# Patient Record
Sex: FEMALE | Race: WHITE | NOT HISPANIC OR LATINO | Employment: OTHER | URBAN - METROPOLITAN AREA
[De-identification: names, ages, dates, MRNs, and addresses within clinical notes are randomized per-mention and may not be internally consistent; named-entity substitution may affect disease eponyms.]

---

## 2017-04-27 PROBLEM — D68.51 FACTOR 5 LEIDEN MUTATION, HETEROZYGOUS: Status: ACTIVE | Noted: 2017-04-27

## 2019-08-20 PROBLEM — R73.09 HEMOGLOBIN A1C LESS THAN 7.0%: Status: ACTIVE | Noted: 2019-08-20

## 2019-08-20 PROBLEM — Z86.718 HISTORY OF DVT (DEEP VEIN THROMBOSIS): Status: ACTIVE | Noted: 2019-08-20

## 2019-08-20 PROBLEM — Z86.711 HISTORY OF PULMONARY EMBOLUS (PE): Status: ACTIVE | Noted: 2019-08-20

## 2019-08-27 ENCOUNTER — HOSPITAL ENCOUNTER (OUTPATIENT)
Dept: RADIOLOGY | Facility: HOSPITAL | Age: 74
Discharge: HOME | End: 2019-08-27
Attending: NURSE PRACTITIONER
Payer: MEDICARE

## 2019-08-27 DIAGNOSIS — Z13.820 OSTEOPOROSIS SCREENING: ICD-10-CM

## 2019-08-27 DIAGNOSIS — Z78.0 POST-MENOPAUSAL: ICD-10-CM

## 2019-08-27 DIAGNOSIS — Z87.39 HISTORY OF OSTEOPOROSIS: ICD-10-CM

## 2019-08-27 PROCEDURE — 77080 DXA BONE DENSITY AXIAL: CPT | Mod: 26,,, | Performed by: RADIOLOGY

## 2019-08-27 PROCEDURE — 77080 DEXA BONE DENSITY SPINE HIP: ICD-10-PCS | Mod: 26,,, | Performed by: RADIOLOGY

## 2019-08-27 PROCEDURE — 77080 DXA BONE DENSITY AXIAL: CPT | Mod: TC

## 2019-09-04 ENCOUNTER — HOSPITAL ENCOUNTER (OUTPATIENT)
Dept: RADIOLOGY | Facility: HOSPITAL | Age: 74
Discharge: HOME | End: 2019-09-04
Attending: NURSE PRACTITIONER
Payer: MEDICARE

## 2019-09-04 DIAGNOSIS — Z87.19 HISTORY OF DIVERTICULITIS: ICD-10-CM

## 2019-09-04 DIAGNOSIS — R10.32 LLQ PAIN: ICD-10-CM

## 2019-09-04 DIAGNOSIS — R53.83 FATIGUE, UNSPECIFIED TYPE: ICD-10-CM

## 2019-09-04 DIAGNOSIS — R14.2 BELCHING: ICD-10-CM

## 2019-09-04 PROCEDURE — 74177 CT ABDOMEN PELVIS WITH CONTRAST: ICD-10-PCS | Mod: 26,,, | Performed by: RADIOLOGY

## 2019-09-04 PROCEDURE — 74177 CT ABD & PELVIS W/CONTRAST: CPT | Mod: TC

## 2019-09-04 PROCEDURE — 25500020 PHARM REV CODE 255: Performed by: NURSE PRACTITIONER

## 2019-09-04 PROCEDURE — 74177 CT ABD & PELVIS W/CONTRAST: CPT | Mod: 26,,, | Performed by: RADIOLOGY

## 2019-09-04 RX ADMIN — IOHEXOL 15 ML: 300 INJECTION, SOLUTION INTRAVENOUS at 10:09

## 2019-09-04 RX ADMIN — IOHEXOL 15 ML: 300 INJECTION, SOLUTION INTRAVENOUS at 11:09

## 2019-09-04 RX ADMIN — IOHEXOL 75 ML: 350 INJECTION, SOLUTION INTRAVENOUS at 12:09

## 2020-01-30 ENCOUNTER — LAB VISIT (OUTPATIENT)
Dept: LAB | Facility: HOSPITAL | Age: 75
End: 2020-01-30
Attending: NURSE PRACTITIONER
Payer: MEDICARE

## 2020-01-30 DIAGNOSIS — Z79.01 LONG TERM CURRENT USE OF ANTICOAGULANT THERAPY: ICD-10-CM

## 2020-01-30 DIAGNOSIS — K92.1 BLOOD IN STOOL: ICD-10-CM

## 2020-01-30 DIAGNOSIS — D68.51 FACTOR 5 LEIDEN MUTATION, HETEROZYGOUS: ICD-10-CM

## 2020-01-30 PROBLEM — Z86.718 HISTORY OF DVT (DEEP VEIN THROMBOSIS): Status: RESOLVED | Noted: 2019-08-20 | Resolved: 2020-01-30

## 2020-01-30 PROBLEM — Z86.711 HISTORY OF PULMONARY EMBOLUS (PE): Status: RESOLVED | Noted: 2019-08-20 | Resolved: 2020-01-30

## 2020-01-30 LAB
BASOPHILS # BLD AUTO: 0.04 K/UL (ref 0–0.2)
BASOPHILS NFR BLD: 0.6 % (ref 0–1.9)
DIFFERENTIAL METHOD: NORMAL
EOSINOPHIL # BLD AUTO: 0.1 K/UL (ref 0–0.5)
EOSINOPHIL NFR BLD: 1.8 % (ref 0–8)
ERYTHROCYTE [DISTWIDTH] IN BLOOD BY AUTOMATED COUNT: 13.6 % (ref 11.5–14.5)
HCT VFR BLD AUTO: 42 % (ref 37–48.5)
HGB BLD-MCNC: 13.7 G/DL (ref 12–16)
IMM GRANULOCYTES # BLD AUTO: 0.01 K/UL (ref 0–0.04)
IMM GRANULOCYTES NFR BLD AUTO: 0.1 % (ref 0–0.5)
LYMPHOCYTES # BLD AUTO: 2.4 K/UL (ref 1–4.8)
LYMPHOCYTES NFR BLD: 34.4 % (ref 18–48)
MCH RBC QN AUTO: 29.8 PG (ref 27–31)
MCHC RBC AUTO-ENTMCNC: 32.6 G/DL (ref 32–36)
MCV RBC AUTO: 92 FL (ref 82–98)
MONOCYTES # BLD AUTO: 0.6 K/UL (ref 0.3–1)
MONOCYTES NFR BLD: 8.8 % (ref 4–15)
NEUTROPHILS # BLD AUTO: 3.7 K/UL (ref 1.8–7.7)
NEUTROPHILS NFR BLD: 54.3 % (ref 38–73)
NRBC BLD-RTO: 0 /100 WBC
PLATELET # BLD AUTO: 204 K/UL (ref 150–350)
PMV BLD AUTO: 10.1 FL (ref 9.2–12.9)
RBC # BLD AUTO: 4.59 M/UL (ref 4–5.4)
WBC # BLD AUTO: 6.84 K/UL (ref 3.9–12.7)

## 2020-01-30 PROCEDURE — 85025 COMPLETE CBC W/AUTO DIFF WBC: CPT

## 2020-01-30 PROCEDURE — 36415 COLL VENOUS BLD VENIPUNCTURE: CPT

## 2020-02-21 ENCOUNTER — OFFICE VISIT (OUTPATIENT)
Dept: SURGERY | Facility: CLINIC | Age: 75
End: 2020-02-21
Payer: MEDICARE

## 2020-02-21 VITALS
HEIGHT: 69 IN | WEIGHT: 167 LBS | BODY MASS INDEX: 24.73 KG/M2 | OXYGEN SATURATION: 96 % | HEART RATE: 83 BPM | TEMPERATURE: 97 F | SYSTOLIC BLOOD PRESSURE: 129 MMHG | DIASTOLIC BLOOD PRESSURE: 93 MMHG | RESPIRATION RATE: 14 BRPM

## 2020-02-21 DIAGNOSIS — Z01.818 PRE-OP TESTING: ICD-10-CM

## 2020-02-21 DIAGNOSIS — K92.1 HEMATOCHEZIA: Primary | ICD-10-CM

## 2020-02-21 DIAGNOSIS — K21.9 GASTROESOPHAGEAL REFLUX DISEASE, ESOPHAGITIS PRESENCE NOT SPECIFIED: ICD-10-CM

## 2020-02-21 PROCEDURE — 99204 PR OFFICE/OUTPT VISIT, NEW, LEVL IV, 45-59 MIN: ICD-10-PCS | Mod: S$GLB,,, | Performed by: SURGERY

## 2020-02-21 PROCEDURE — 99204 OFFICE O/P NEW MOD 45 MIN: CPT | Mod: S$GLB,,, | Performed by: SURGERY

## 2020-02-21 RX ORDER — SODIUM CHLORIDE 9 MG/ML
INJECTION, SOLUTION INTRAVENOUS CONTINUOUS
Status: CANCELLED | OUTPATIENT
Start: 2020-02-21

## 2020-02-21 RX ORDER — POLYETHYLENE GLYCOL 3350, SODIUM SULFATE ANHYDROUS, SODIUM BICARBONATE, SODIUM CHLORIDE, POTASSIUM CHLORIDE 236; 22.74; 6.74; 5.86; 2.97 G/4L; G/4L; G/4L; G/4L; G/4L
POWDER, FOR SOLUTION ORAL ONCE
Status: ON HOLD | COMMUNITY
End: 2020-03-20 | Stop reason: HOSPADM

## 2020-02-21 RX ORDER — LIDOCAINE HYDROCHLORIDE 10 MG/ML
1 INJECTION, SOLUTION EPIDURAL; INFILTRATION; INTRACAUDAL; PERINEURAL ONCE
Status: DISCONTINUED | OUTPATIENT
Start: 2020-02-21 | End: 2020-05-13

## 2020-02-21 NOTE — PATIENT INSTRUCTIONS
Upper GI Endoscopy     During endoscopy, a long, flexible tube is used to view the inside of your upper GI tract.      Upper GI endoscopy allows your healthcare provider to look directly into the beginning of your gastrointestinal (GI) tract. The esophagus, stomach, and duodenum (the first part of the small intestine) make up the upper GI tract.   Before the exam  Follow these and any other instructions you are given before your endoscopy. If you dont follow the healthcare providers instructions carefully, the test may need to be canceled or done over:  · Don't eat or drink anything after midnight the night before your exam. If your exam is in the afternoon, drink only clear liquids in the morning. Don't eat or drink anything for 8 hours before the exam. In some cases, you may be able to take medicines with sips of water until 2 hours before the procedure. Speak with your healthcare provider about this.   · Bring your X-rays and any other test results you have.  · Because you will be sedated, arrange for an adult to drive you home after the exam.  · Tell your healthcare provider before the exam if you are taking any medicines or have any medical problems.  The procedure  Here is what to expect:  · You will lie on the endoscopy table. Usually patients lie on the left side.  · You will be monitored and given oxygen.  · Your throat may be numbed with a spray or gargle. You are given medicine through an intravenous (IV) line that will help you relax and remain comfortable. You may be awake or asleep during the procedure.  · The healthcare provider will put the endoscope in your mouth and down your esophagus. It is thinner than most pieces of food that you swallow. It will not affect your breathing. The medicine helps keep you from gagging.  · Air is put into your GI tract to expand it. It can make you burp.  · During the procedure, the healthcare provider can take biopsies (tissue samples), remove abnormalities,  such as polyps, or treat abnormalities through a variety of devices placed through the endoscope. You will not feel this.   · The endoscope carries images of your upper GI tract to a video screen. If you are awake, you may be able to look at the images.  · After the procedure is done, you will rest for a time. An adult must drive you home.  When to call your healthcare provider  Contact your healthcare provider if you have:  · Black or tarry stools, or blood in your stool  · Fever  · Pain in your belly that does not go away  · Nausea and vomiting, or vomiting blood   Date Last Reviewed: 7/1/2016 © 2000-2017 [employer]. [address] [place] [address], [place] [address]. All rights reserved. This information is not intended as a substitute for professional medical care. Always follow your healthcare professional's instructions.        Colonoscopy     A camera attached to a flexible tube with a viewing lens is used to take video pictures.     Colonoscopy is a test to view the inside of your lower digestive tract (colon and rectum). Sometimes it can show the last part of the small intestine (ileum). During the test, small pieces of tissue may be removed for testing. This is called a biopsy. Small growths, such as polyps, may also be removed.   Why is colonoscopy done?  The test is done to help look for colon cancer. And it can help find the source of abdominal pain, bleeding, and changes in bowel habits. It may be needed once a year, depending on factors such as your:  · Age  · Health history  · Family health history  · Symptoms  · Results from any prior colonoscopy  Risks and possible complications  These include:  · Bleeding               · A puncture or tear in the colon   · Risks of anesthesia  · A cancer lesion not being seen  Getting ready   To prepare for the test:  · Talk with your healthcare provider about the risks of the test (see below). Also ask your healthcare provider about alternatives to the  test.  · Tell your healthcare provider about any medicines you take. Also tell him or her about any health conditions you may have.  · Make sure your rectum and colon are empty for the test. Follow the diet and bowel prep instructions exactly. If you dont, the test may need to be rescheduled.  · Plan for a friend or family member to drive you home after the test.     Colonoscopy provides an inside view of the entire colon.     You may discuss the results with your doctor right away or at a future visit.  During the test   The test is usually done in the hospital on an outpatient basis. This means you go home the same day. The procedure takes about 30 minutes. During that time:  · You are given relaxing (sedating) medicine through an IV line. You may be drowsy, or fully asleep.  · The healthcare provider will first give you a physical exam to check for anal and rectal problems.  · Then the anus is lubricated and the scope inserted.  · If you are awake, you may have a feeling similar to needing to have a bowel movement. You may also feel pressure as air is pumped into the colon. Its OK to pass gas during the procedure.  · Biopsy, polyp removal, or other treatments may be done during the test.  After the test   You may have gas right after the test. It can help to try to pass it to help prevent later bloating. Your healthcare provider may discuss the results with you right away. Or you may need to schedule a follow-up visit to talk about the results. After the test, you can go back to your normal eating and other activities. You may be tired from the sedation and need to rest for a few hours.  Date Last Reviewed: 11/1/2016 © 2000-2017 [employer]. [address] [place] [address]. All rights reserved. This information is not intended as a substitute for professional medical care. Always follow your healthcare professional's instructions.

## 2020-02-21 NOTE — H&P
[facility] H&P Note    Subjective:       Patient ID: Kellie Monte is a 74 y.o. female.    Chief Complaint: Consult (Referral- yusuf Miles, NP- Blood in stool)    HPI:  Kellie Monte is a 74 y.o. female history of blood clots diagnosed with factor 5 Leiden currently on Xarelto therapy presents today as a new patient referral from YUSUF Miles NP for evaluation of hematochezia as well as bloating, burping, indigestion.  Patient stated that she has had bloating, burping, indigestion for number of years.  No significant worsening symptoms.  Last EGD for this was numerous years ago.  Patient also with recent history of hematochezia.  Patient stated that it started approximately 3 weeks ago and stopped couple days afterwards.  Bright red blood, in stool and on toilet paper.  Patient with personal history of colonoscopy in 2014 which showed no evidence of colon polyps or neoplasms.  There was diverticula present. She with the bleeding got significantly concerned and presented to primary care provider who then referred the patient here today for evaluation treatment.  Patient does have a family history of colon cancer in a mother who underwent colon resection.  No weight loss or bowel habit changes otherwise.    Past Medical History:   Diagnosis Date    Blood clot in vein     Deep vein thrombosis     while pregnant in 1972, subsequent to that while on ocp, then 2 years ago. Recommended to have lifelong anticoag at [facility].    DVT (deep venous thrombosis) 1972    While pregnant.    Factor 5 Leiden mutation, heterozygous     History of DVT (deep vein thrombosis) 8/20/2019    OP (osteoporosis)     Osteoarthritis     Pulmonary embolism      Past Surgical History:   Procedure Laterality Date    APPENDECTOMY      CHOLECYSTECTOMY      COLONOSCOPY  8/07    Dr. Waters    cyst removed ENT (throat)      INNER EAR SURGERY Left     [employer]    SKIN CANCER EXCISION      tonsils      TUBAL LIGATION        Family History   Problem Relation Age of Onset    Cancer Father 75        bone    Clotting disorder Father     Cancer Mother 80        colon    Colon cancer Mother     Angina Mother     Clotting disorder Mother     Clotting disorder Brother     Heart disease Maternal Grandmother     Heart disease Paternal Grandmother      Social History     Socioeconomic History    Marital status:      Spouse name: Not on file    Number of children: Not on file    Years of education: Not on file    Highest education level: Not on file   Occupational History    Not on file   Social Needs    Financial resource strain: Not on file    Food insecurity:     Worry: Not on file     Inability: Not on file    Transportation needs:     Medical: Not on file     Non-medical: Not on file   Tobacco Use    Smoking status: Former Smoker     Packs/day: 2.00     Years: 12.00     Pack years: 24.00     Types: Cigarettes     Last attempt to quit: 1982     Years since quittin.0    Smokeless tobacco: Never Used   Substance and Sexual Activity    Alcohol use: Yes     Alcohol/week: 1.0 standard drinks     Types: 1 Glasses of wine per week     Comment: socially    Drug use: No    Sexual activity: Not Currently     Birth control/protection: None   Lifestyle    Physical activity:     Days per week: Not on file     Minutes per session: Not on file    Stress: Not on file   Relationships    Social connections:     Talks on phone: Not on file     Gets together: Not on file     Attends Voodoo service: Not on file     Active member of club or organization: Not on file     Attends meetings of clubs or organizations: Not on file     Relationship status: Not on file   Other Topics Concern    Not on file   Social History Narrative    Not on file       Current Outpatient Medications   Medication Sig Dispense Refill    atorvastatin (LIPITOR) 10 MG tablet TAKE 1 TABLET BY MOUTH EVERY NIGHT AT BEDTIME 90 tablet 0     diclofenac sodium (VOLTAREN) 1 % Gel Apply 2 g topically once daily.      multivit-mineral-iron-lutein (CENTRUM SILVER ULTRA WOMEN'S) Tab Take 1 tablet by mouth every morning.      pantoprazole (PROTONIX) 40 MG tablet Take 1 tablet (40 mg total) by mouth once daily. 30 tablet 5    polyethylene glycol (GOLYTELY,NULYTELY) 236-22.74-6.74 -5.86 gram suspension Take by mouth once.      rivaroxaban (XARELTO) 20 mg Tab Take 20 mg by mouth once daily.      tramadol (ULTRAM) 50 mg tablet Take 1 tablet (50 mg total) by mouth every 4 (four) hours as needed for Pain. 40 tablet 3     Current Facility-Administered Medications   Medication Dose Route Frequency Provider Last Rate Last Dose    lidocaine (PF) 10 mg/ml (1%) injection 10 mg  1 mL Intradermal Once Jerrod Abarca MD         Review of patient's allergies indicates:  No Known Allergies    Review of Systems   Constitutional: Negative for activity change, appetite change, chills and fever.   HENT: Negative for congestion, dental problem and ear discharge.    Eyes: Negative for discharge and itching.   Respiratory: Negative for apnea, choking and chest tightness.    Cardiovascular: Negative for chest pain and leg swelling.   Gastrointestinal: Positive for blood in stool. Negative for abdominal distention, abdominal pain, anal bleeding, constipation, diarrhea and nausea.   Endocrine: Negative for cold intolerance and heat intolerance.   Genitourinary: Negative for difficulty urinating and dyspareunia.   Musculoskeletal: Negative for arthralgias and back pain.   Skin: Negative for color change and pallor.   Neurological: Negative for dizziness and facial asymmetry.   Hematological: Negative for adenopathy. Does not bruise/bleed easily.   Psychiatric/Behavioral: Negative for agitation and behavioral problems.       Objective:      Vitals:    02/21/20 1048   BP: (!) 129/93   BP Location: Left arm   Patient Position: Sitting   BP Method: Large (Automatic)   Pulse: 83  "  Resp: 14   Temp: 96.7 °F (35.9 °C)   TempSrc: Oral   SpO2: 96%   Weight: 75.8 kg (167 lb)   Height: 5' 9" (1.753 m)     Physical Exam   Constitutional: She is oriented to person, place, and time. She appears well-developed and well-nourished. No distress.   HENT:   Head: Normocephalic and atraumatic.   Eyes: Pupils are equal, round, and reactive to light. EOM are normal.   Neck: Normal range of motion. Neck supple. No thyromegaly present.   Cardiovascular: Normal rate and regular rhythm.   Pulmonary/Chest: Effort normal and breath sounds normal.   Abdominal: Soft. Bowel sounds are normal. She exhibits no distension. There is no tenderness.   Musculoskeletal: Normal range of motion. She exhibits no edema or deformity.   Neurological: She is alert and oriented to person, place, and time. No cranial nerve deficit.   Skin: Skin is warm. Capillary refill takes less than 2 seconds. No erythema.   Psychiatric: She has a normal mood and affect. Her behavior is normal.        Assessment:       1. Hematochezia    2. Pre-op testing    3. Gastroesophageal reflux disease, esophagitis presence not specified        Plan:   Hematochezia  -     Case Request Operating Room: COLONOSCOPY, ESOPHAGOGASTRODUODENOSCOPY (EGD)    Pre-op testing  -     CBC auto differential; Future; Expected date: 05/18/2020  -     Comprehensive metabolic panel; Future; Expected date: 05/18/2020  -     EKG 12-lead; Future; Expected date: 05/18/2020    Gastroesophageal reflux disease, esophagitis presence not specified  -     Case Request Operating Room: COLONOSCOPY, ESOPHAGOGASTRODUODENOSCOPY (EGD)    Other orders  -     Progressive Mobility Protocol (mobilize patient to their highest level of functioning at least twice daily); Standing  -     Insert peripheral IV; Standing  -     lidocaine (PF) 10 mg/ml (1%) injection 10 mg  -     Full code; Standing        Medical Decision Making/Counseling:  Patient with hematochezia.  New diagnosis.  Now since stopped.  " On long-term anticoagulation therapy for previous DVTs.  Patient in need of colonoscopy for evaluation of this hematochezia.  Patient also with bloating indigestion reflux disease.  In need of EGD for further evaluation of these diagnosis sees.  Risk and benefits of EGD and colonoscopy discussed in detail in clinic today.  After risk and benefits patient voiced understanding of the risk benefits wished to proceed the near future.    Patient on long-term Xarelto therapy.  She was instructed to stop her Xarelto therapy 3 days prior to EGD and colonoscopy.    Will obtain preoperative lab test to include CBC, CMP and EKG for review by the Anesthesiologist the day of the procedure prior to induction of the anesthetic agent of choice.    Risk and benefits of EGD were discussed in clinic in depth.  Risk of EGD were discussed to include bleeding as well as perforation.  Patient understands that if the above procedural risks were to occur, he could need further intervention to include but not exclude a blood transfusion, repeat procedure, admission to the hospital, or even surgery which would likely require transfer to a higher level of care.     Risks and benefits of Colonoscopy were discussed in depth in clinic as well.  From a procedural standpoint, we discuss the benefits of colonoscopy to be finding colon cancers at early stages, including polyps which can be endoscopically resectable, to finding early stage colon cancers which can be better treated with current medical and surgical therapies in order to give patients a longer survival, if found in these early stages.  From a standpoint of risks, the risk of bleeding and perforation of the colon were discussed.  I personally discussed that if complications of bleeding or perforation were to occur, the patient could need as little as a blood transfusion and as much as possible hospital admission, repeat procedure, or even surgery.  During today's discussion of the  procedure of colonoscopy with the patient, I personally jose a the patient a picture to assist with counseling.  Total clinic time spent today 45 minutes face-to-face, with greater than half of the time spent in face to face counseling.    Patient instructed that best way to communicate with my office staff is for patient to get on the [facility] epic patient portal to expedite communication and communication issues that may occur.  Patient was given instructions on how to get on the portal.  I encouraged patient to obtain portal access as well.  Ultimately it is up to the patient to obtain access.  Patient voiced understanding.

## 2020-02-21 NOTE — H&P (VIEW-ONLY)
[facility] H&P Note    Subjective:       Patient ID: Kellie Monte is a 74 y.o. female.    Chief Complaint: Consult (Referral- yusuf Miles, NP- Blood in stool)    HPI:  Kellie Monte is a 74 y.o. female history of blood clots diagnosed with factor 5 Leiden currently on Xarelto therapy presents today as a new patient referral from YUSUF Miles NP for evaluation of hematochezia as well as bloating, burping, indigestion.  Patient stated that she has had bloating, burping, indigestion for number of years.  No significant worsening symptoms.  Last EGD for this was numerous years ago.  Patient also with recent history of hematochezia.  Patient stated that it started approximately 3 weeks ago and stopped couple days afterwards.  Bright red blood, in stool and on toilet paper.  Patient with personal history of colonoscopy in 2014 which showed no evidence of colon polyps or neoplasms.  There was diverticula present. She with the bleeding got significantly concerned and presented to primary care provider who then referred the patient here today for evaluation treatment.  Patient does have a family history of colon cancer in a mother who underwent colon resection.  No weight loss or bowel habit changes otherwise.    Past Medical History:   Diagnosis Date    Blood clot in vein     Deep vein thrombosis     while pregnant in 1972, subsequent to that while on ocp, then 2 years ago. Recommended to have lifelong anticoag at [facility].    DVT (deep venous thrombosis) 1972    While pregnant.    Factor 5 Leiden mutation, heterozygous     History of DVT (deep vein thrombosis) 8/20/2019    OP (osteoporosis)     Osteoarthritis     Pulmonary embolism      Past Surgical History:   Procedure Laterality Date    APPENDECTOMY      CHOLECYSTECTOMY      COLONOSCOPY  8/07    Dr. Waters    cyst removed ENT (throat)      INNER EAR SURGERY Left     [employer]    SKIN CANCER EXCISION      tonsils      TUBAL LIGATION        Family History   Problem Relation Age of Onset    Cancer Father 75        bone    Clotting disorder Father     Cancer Mother 80        colon    Colon cancer Mother     Angina Mother     Clotting disorder Mother     Clotting disorder Brother     Heart disease Maternal Grandmother     Heart disease Paternal Grandmother      Social History     Socioeconomic History    Marital status:      Spouse name: Not on file    Number of children: Not on file    Years of education: Not on file    Highest education level: Not on file   Occupational History    Not on file   Social Needs    Financial resource strain: Not on file    Food insecurity:     Worry: Not on file     Inability: Not on file    Transportation needs:     Medical: Not on file     Non-medical: Not on file   Tobacco Use    Smoking status: Former Smoker     Packs/day: 2.00     Years: 12.00     Pack years: 24.00     Types: Cigarettes     Last attempt to quit: 1982     Years since quittin.0    Smokeless tobacco: Never Used   Substance and Sexual Activity    Alcohol use: Yes     Alcohol/week: 1.0 standard drinks     Types: 1 Glasses of wine per week     Comment: socially    Drug use: No    Sexual activity: Not Currently     Birth control/protection: None   Lifestyle    Physical activity:     Days per week: Not on file     Minutes per session: Not on file    Stress: Not on file   Relationships    Social connections:     Talks on phone: Not on file     Gets together: Not on file     Attends Restorationism service: Not on file     Active member of club or organization: Not on file     Attends meetings of clubs or organizations: Not on file     Relationship status: Not on file   Other Topics Concern    Not on file   Social History Narrative    Not on file       Current Outpatient Medications   Medication Sig Dispense Refill    atorvastatin (LIPITOR) 10 MG tablet TAKE 1 TABLET BY MOUTH EVERY NIGHT AT BEDTIME 90 tablet 0     diclofenac sodium (VOLTAREN) 1 % Gel Apply 2 g topically once daily.      multivit-mineral-iron-lutein (CENTRUM SILVER ULTRA WOMEN'S) Tab Take 1 tablet by mouth every morning.      pantoprazole (PROTONIX) 40 MG tablet Take 1 tablet (40 mg total) by mouth once daily. 30 tablet 5    polyethylene glycol (GOLYTELY,NULYTELY) 236-22.74-6.74 -5.86 gram suspension Take by mouth once.      rivaroxaban (XARELTO) 20 mg Tab Take 20 mg by mouth once daily.      tramadol (ULTRAM) 50 mg tablet Take 1 tablet (50 mg total) by mouth every 4 (four) hours as needed for Pain. 40 tablet 3     Current Facility-Administered Medications   Medication Dose Route Frequency Provider Last Rate Last Dose    lidocaine (PF) 10 mg/ml (1%) injection 10 mg  1 mL Intradermal Once Jerrod Abarca MD         Review of patient's allergies indicates:  No Known Allergies    Review of Systems   Constitutional: Negative for activity change, appetite change, chills and fever.   HENT: Negative for congestion, dental problem and ear discharge.    Eyes: Negative for discharge and itching.   Respiratory: Negative for apnea, choking and chest tightness.    Cardiovascular: Negative for chest pain and leg swelling.   Gastrointestinal: Positive for blood in stool. Negative for abdominal distention, abdominal pain, anal bleeding, constipation, diarrhea and nausea.   Endocrine: Negative for cold intolerance and heat intolerance.   Genitourinary: Negative for difficulty urinating and dyspareunia.   Musculoskeletal: Negative for arthralgias and back pain.   Skin: Negative for color change and pallor.   Neurological: Negative for dizziness and facial asymmetry.   Hematological: Negative for adenopathy. Does not bruise/bleed easily.   Psychiatric/Behavioral: Negative for agitation and behavioral problems.       Objective:      Vitals:    02/21/20 1048   BP: (!) 129/93   BP Location: Left arm   Patient Position: Sitting   BP Method: Large (Automatic)   Pulse: 83  "  Resp: 14   Temp: 96.7 °F (35.9 °C)   TempSrc: Oral   SpO2: 96%   Weight: 75.8 kg (167 lb)   Height: 5' 9" (1.753 m)     Physical Exam   Constitutional: She is oriented to person, place, and time. She appears well-developed and well-nourished. No distress.   HENT:   Head: Normocephalic and atraumatic.   Eyes: Pupils are equal, round, and reactive to light. EOM are normal.   Neck: Normal range of motion. Neck supple. No thyromegaly present.   Cardiovascular: Normal rate and regular rhythm.   Pulmonary/Chest: Effort normal and breath sounds normal.   Abdominal: Soft. Bowel sounds are normal. She exhibits no distension. There is no tenderness.   Musculoskeletal: Normal range of motion. She exhibits no edema or deformity.   Neurological: She is alert and oriented to person, place, and time. No cranial nerve deficit.   Skin: Skin is warm. Capillary refill takes less than 2 seconds. No erythema.   Psychiatric: She has a normal mood and affect. Her behavior is normal.        Assessment:       1. Hematochezia    2. Pre-op testing    3. Gastroesophageal reflux disease, esophagitis presence not specified        Plan:   Hematochezia  -     Case Request Operating Room: COLONOSCOPY, ESOPHAGOGASTRODUODENOSCOPY (EGD)    Pre-op testing  -     CBC auto differential; Future; Expected date: 05/18/2020  -     Comprehensive metabolic panel; Future; Expected date: 05/18/2020  -     EKG 12-lead; Future; Expected date: 05/18/2020    Gastroesophageal reflux disease, esophagitis presence not specified  -     Case Request Operating Room: COLONOSCOPY, ESOPHAGOGASTRODUODENOSCOPY (EGD)    Other orders  -     Progressive Mobility Protocol (mobilize patient to their highest level of functioning at least twice daily); Standing  -     Insert peripheral IV; Standing  -     lidocaine (PF) 10 mg/ml (1%) injection 10 mg  -     Full code; Standing        Medical Decision Making/Counseling:  Patient with hematochezia.  New diagnosis.  Now since stopped.  " On long-term anticoagulation therapy for previous DVTs.  Patient in need of colonoscopy for evaluation of this hematochezia.  Patient also with bloating indigestion reflux disease.  In need of EGD for further evaluation of these diagnosis sees.  Risk and benefits of EGD and colonoscopy discussed in detail in clinic today.  After risk and benefits patient voiced understanding of the risk benefits wished to proceed the near future.    Patient on long-term Xarelto therapy.  She was instructed to stop her Xarelto therapy 3 days prior to EGD and colonoscopy.    Will obtain preoperative lab test to include CBC, CMP and EKG for review by the Anesthesiologist the day of the procedure prior to induction of the anesthetic agent of choice.    Risk and benefits of EGD were discussed in clinic in depth.  Risk of EGD were discussed to include bleeding as well as perforation.  Patient understands that if the above procedural risks were to occur, he could need further intervention to include but not exclude a blood transfusion, repeat procedure, admission to the hospital, or even surgery which would likely require transfer to a higher level of care.     Risks and benefits of Colonoscopy were discussed in depth in clinic as well.  From a procedural standpoint, we discuss the benefits of colonoscopy to be finding colon cancers at early stages, including polyps which can be endoscopically resectable, to finding early stage colon cancers which can be better treated with current medical and surgical therapies in order to give patients a longer survival, if found in these early stages.  From a standpoint of risks, the risk of bleeding and perforation of the colon were discussed.  I personally discussed that if complications of bleeding or perforation were to occur, the patient could need as little as a blood transfusion and as much as possible hospital admission, repeat procedure, or even surgery.  During today's discussion of the  procedure of colonoscopy with the patient, I personally jose a the patient a picture to assist with counseling.  Total clinic time spent today 45 minutes face-to-face, with greater than half of the time spent in face to face counseling.    Patient instructed that best way to communicate with my office staff is for patient to get on the [facility] patient portal to expedite communication and communication issues that may occur.  Patient was given instructions on how to get on the portal.  I encouraged patient to obtain portal access as well.  Ultimately it is up to the patient to obtain access.  Patient voiced understanding.

## 2020-03-09 ENCOUNTER — TELEPHONE (OUTPATIENT)
Dept: SURGERY | Facility: CLINIC | Age: 75
End: 2020-03-09

## 2020-03-09 NOTE — TELEPHONE ENCOUNTER
Writer spoke to patient and scheduled her an appt on Wednesday 03/11/20 to discuss rectal bleeding and moving her procedure date up. Patient expressed verbal understanding.

## 2020-03-09 NOTE — TELEPHONE ENCOUNTER
----- Message from Lilly Tolliver sent at 3/9/2020  9:04 AM CDT -----  Contact: patient  Type: Needs Medical Advice    Who Called:  patient  Symptoms (please be specific):  Rectal bleeding and blood in stool  How long has patient had these symptoms:  Saturday and Sunday  Best Call Back Number: [phone]  Additional Information: patient has a procedure scheduled for 03/21 but would like to have it moved up sooner because of the issues she has had this weekend.  She is concerned and the provider has instructed her that if she had additional problems she should call to move the date up.  Please call after 11:30 this morning.  She will not be available until then.

## 2020-03-11 ENCOUNTER — OFFICE VISIT (OUTPATIENT)
Dept: SURGERY | Facility: CLINIC | Age: 75
End: 2020-03-11
Payer: MEDICARE

## 2020-03-11 VITALS
OXYGEN SATURATION: 97 % | SYSTOLIC BLOOD PRESSURE: 130 MMHG | WEIGHT: 165 LBS | RESPIRATION RATE: 13 BRPM | DIASTOLIC BLOOD PRESSURE: 85 MMHG | HEART RATE: 81 BPM | HEIGHT: 69 IN | TEMPERATURE: 97 F | BODY MASS INDEX: 24.44 KG/M2

## 2020-03-11 DIAGNOSIS — K92.1 HEMATOCHEZIA: Primary | ICD-10-CM

## 2020-03-11 PROCEDURE — 99213 PR OFFICE/OUTPT VISIT, EST, LEVL III, 20-29 MIN: ICD-10-PCS | Mod: S$GLB,,, | Performed by: SURGERY

## 2020-03-11 PROCEDURE — 99213 OFFICE O/P EST LOW 20 MIN: CPT | Mod: S$GLB,,, | Performed by: SURGERY

## 2020-03-11 NOTE — PROGRESS NOTES
[facility]  Follow-up    Subjective:       Patient ID: Kellie Monte is a 74 y.o. female.    Chief Complaint: Follow-up (Reschedule procedure due to recent rectal bleeding)      HPI:  Kellie Monte is a 74 y.o. female presents today for follow-up examination of hematochezia.  Patient since last visit has been placed on the schedule for EGD and colonoscopy however next available date was April.  Patient last weekend had an episode of hematochezia again, bright red, dark red in stool and bright red on toilet paper.  Lasted 2 days.  Fair amount of blood per her account.  She is on chronic long-term anticoagulation with Xarelto for history of DVTs in factor 5 Leiden.  Patient given the hematochezia got concerned desires for sooner clinic visit sooner procedure. He now presents today established patient follow-up examination.    Review of Systems   Constitutional: Negative for activity change, appetite change, chills and fever.   HENT: Negative for congestion, dental problem and ear discharge.    Eyes: Negative for discharge and itching.   Respiratory: Negative for apnea, choking and chest tightness.    Cardiovascular: Negative for chest pain and leg swelling.   Gastrointestinal: Positive for blood in stool. Negative for abdominal distention, abdominal pain, anal bleeding, constipation, diarrhea and nausea.   Endocrine: Negative for cold intolerance and heat intolerance.   Genitourinary: Negative for difficulty urinating and dyspareunia.   Musculoskeletal: Negative for arthralgias and back pain.   Skin: Negative for color change and pallor.   Neurological: Negative for dizziness and facial asymmetry.   Hematological: Negative for adenopathy. Does not bruise/bleed easily.   Psychiatric/Behavioral: Negative for agitation and behavioral problems.       Objective:      Vitals:    03/11/20 1000   BP: 130/85   BP Location: Right arm   Patient Position: Sitting   BP Method: Large (Automatic)   Pulse: 81   Resp:  "13   Temp: 97.3 °F (36.3 °C)   TempSrc: Oral   SpO2: 97%   Weight: 74.8 kg (165 lb)   Height: 5' 9" (1.753 m)     Physical Exam   Constitutional: She is oriented to person, place, and time. She appears well-developed and well-nourished. No distress.   HENT:   Head: Normocephalic and atraumatic.   Eyes: Pupils are equal, round, and reactive to light. EOM are normal.   Neck: Normal range of motion. Neck supple. No thyromegaly present.   Cardiovascular: Normal rate and regular rhythm.   Pulmonary/Chest: Effort normal and breath sounds normal.   Abdominal: Soft. Bowel sounds are normal. She exhibits no distension. There is no tenderness.   Musculoskeletal: Normal range of motion. She exhibits no edema or deformity.   Neurological: She is alert and oriented to person, place, and time. No cranial nerve deficit.   Skin: Skin is warm. Capillary refill takes less than 2 seconds. No erythema.   Psychiatric: She has a normal mood and affect. Her behavior is normal.        Assessment:       1. Hematochezia        Plan:   Hematochezia      Medical Decision Making/Counseling:  Recurrent hematochezia, long-term anticoagulation use.  No evidence of weakness, lethargy, etc to suggest anemia.  Bleeding has since stopped.  Patient concerned today and states she cannot wait 6 weeks.  Mother with colon cancer, she desires to know if she has this prior to 6 weeks from now.  Previous history of bloating belching additionally EGD necessary.  Will try to get patient on the schedule for next week if possible.  May have to be with my partner if I am unavailable.  Xarelto to be stopped 3 days prior to EGD and colonoscopy.    Follow up:  After EGD and colonoscopy.    Patient instructed that best way to communicate with my office staff is for patient to get on the [facility] patient portal to expedite communication and communication issues that may occur.  Patient was given instructions on how to get on the portal.  I encouraged patient to " obtain portal access as well.  Ultimately it is up to the patient to obtain access.  Patient voiced understanding.

## 2020-03-17 ENCOUNTER — HOSPITAL ENCOUNTER (OUTPATIENT)
Dept: PREADMISSION TESTING | Facility: HOSPITAL | Age: 75
Discharge: HOME | End: 2020-03-17
Attending: SURGERY
Payer: MEDICARE

## 2020-03-17 ENCOUNTER — HOSPITAL ENCOUNTER (OUTPATIENT)
Dept: CARDIOLOGY | Facility: HOSPITAL | Age: 75
Discharge: HOME | End: 2020-03-17
Attending: SURGERY
Payer: MEDICARE

## 2020-03-17 DIAGNOSIS — Z01.818 PRE-OP TESTING: ICD-10-CM

## 2020-03-17 PROCEDURE — 93005 ELECTROCARDIOGRAM TRACING: CPT

## 2020-03-18 ENCOUNTER — TELEPHONE (OUTPATIENT)
Dept: SURGERY | Facility: CLINIC | Age: 75
End: 2020-03-18

## 2020-03-18 NOTE — TELEPHONE ENCOUNTER
----- Message from Ryan Salinas sent at 3/18/2020 11:14 AM CDT -----  Contact: toro  Type: Needs Medical Advice    Who Called:  patient  Symptoms (please be specific):    How long has patient had these symptoms:    Pharmacy name and phone #:    Best Call Back Number: [phone]  Additional Information: requesting a call back regarding prep for procedure have questions?

## 2020-03-18 NOTE — TELEPHONE ENCOUNTER
Returned phone call to pt.  Pt is asking if her procedure is still on for Friday.  Advised pt that she should be prepared to start her prep as scheduled tomorrow because she was not on the list of patients that had to be postponed and to proceed with everything as scheduled unless she gets a phone call from Surgery telling her otherwise.  Pt verbalizes understanding.

## 2020-03-20 ENCOUNTER — HOSPITAL ENCOUNTER (OUTPATIENT)
Facility: HOSPITAL | Age: 75
Discharge: HOME | End: 2020-03-20
Attending: SURGERY | Admitting: SURGERY
Payer: MEDICARE

## 2020-03-20 ENCOUNTER — ANESTHESIA EVENT (OUTPATIENT)
Dept: SURGERY | Facility: HOSPITAL | Age: 75
End: 2020-03-20
Payer: MEDICARE

## 2020-03-20 ENCOUNTER — ANESTHESIA (OUTPATIENT)
Dept: SURGERY | Facility: HOSPITAL | Age: 75
End: 2020-03-20
Payer: MEDICARE

## 2020-03-20 VITALS
SYSTOLIC BLOOD PRESSURE: 131 MMHG | RESPIRATION RATE: 13 BRPM | TEMPERATURE: 98 F | BODY MASS INDEX: 24.44 KG/M2 | WEIGHT: 165 LBS | OXYGEN SATURATION: 100 % | HEART RATE: 63 BPM | HEIGHT: 69 IN | DIASTOLIC BLOOD PRESSURE: 85 MMHG

## 2020-03-20 DIAGNOSIS — K92.1 HEMATOCHEZIA: ICD-10-CM

## 2020-03-20 PROBLEM — K29.30 CHRONIC SUPERFICIAL GASTRITIS WITHOUT BLEEDING: Status: ACTIVE | Noted: 2020-03-20

## 2020-03-20 PROBLEM — D12.5 BENIGN NEOPLASM OF SIGMOID COLON: Status: ACTIVE | Noted: 2020-03-20

## 2020-03-20 PROBLEM — K64.8 INTERNAL HEMORRHOIDS: Status: ACTIVE | Noted: 2020-03-20

## 2020-03-20 LAB
ALBUMIN SERPL BCP-MCNC: 3.6 G/DL (ref 3.5–5.2)
ALP SERPL-CCNC: 63 U/L (ref 55–135)
ALT SERPL W/O P-5'-P-CCNC: 16 U/L (ref 10–44)
ANION GAP SERPL CALC-SCNC: 9 MMOL/L (ref 8–16)
AST SERPL-CCNC: 21 U/L (ref 10–40)
BASOPHILS # BLD AUTO: 0.03 K/UL (ref 0–0.2)
BASOPHILS NFR BLD: 0.6 % (ref 0–1.9)
BILIRUB SERPL-MCNC: 0.5 MG/DL (ref 0.1–1)
BUN SERPL-MCNC: 11 MG/DL (ref 8–23)
CALCIUM SERPL-MCNC: 8.8 MG/DL (ref 8.7–10.5)
CHLORIDE SERPL-SCNC: 105 MMOL/L (ref 95–110)
CO2 SERPL-SCNC: 26 MMOL/L (ref 23–29)
CREAT SERPL-MCNC: 0.9 MG/DL (ref 0.5–1.4)
DIFFERENTIAL METHOD: NORMAL
EOSINOPHIL # BLD AUTO: 0.1 K/UL (ref 0–0.5)
EOSINOPHIL NFR BLD: 2.4 % (ref 0–8)
ERYTHROCYTE [DISTWIDTH] IN BLOOD BY AUTOMATED COUNT: 13.1 % (ref 11.5–14.5)
EST. GFR  (AFRICAN AMERICAN): >60 ML/MIN/1.73 M^2
EST. GFR  (NON AFRICAN AMERICAN): >60 ML/MIN/1.73 M^2
GLUCOSE SERPL-MCNC: 91 MG/DL (ref 70–110)
HCT VFR BLD AUTO: 38.9 % (ref 37–48.5)
HGB BLD-MCNC: 12.6 G/DL (ref 12–16)
IMM GRANULOCYTES # BLD AUTO: 0 K/UL (ref 0–0.04)
IMM GRANULOCYTES NFR BLD AUTO: 0 % (ref 0–0.5)
LYMPHOCYTES # BLD AUTO: 1.9 K/UL (ref 1–4.8)
LYMPHOCYTES NFR BLD: 40.3 % (ref 18–48)
MCH RBC QN AUTO: 29.9 PG (ref 27–31)
MCHC RBC AUTO-ENTMCNC: 32.4 G/DL (ref 32–36)
MCV RBC AUTO: 92 FL (ref 82–98)
MONOCYTES # BLD AUTO: 0.4 K/UL (ref 0.3–1)
MONOCYTES NFR BLD: 9.2 % (ref 4–15)
NEUTROPHILS # BLD AUTO: 2.2 K/UL (ref 1.8–7.7)
NEUTROPHILS NFR BLD: 47.5 % (ref 38–73)
NRBC BLD-RTO: 0 /100 WBC
PLATELET # BLD AUTO: 190 K/UL (ref 150–350)
PMV BLD AUTO: 10.4 FL (ref 9.2–12.9)
POTASSIUM SERPL-SCNC: 4.4 MMOL/L (ref 3.5–5.1)
PROT SERPL-MCNC: 6.5 G/DL (ref 6–8.4)
RBC # BLD AUTO: 4.21 M/UL (ref 4–5.4)
SODIUM SERPL-SCNC: 140 MMOL/L (ref 136–145)
WBC # BLD AUTO: 4.66 K/UL (ref 3.9–12.7)

## 2020-03-20 PROCEDURE — 37000009 HC ANESTHESIA EA ADD 15 MINS: Performed by: SURGERY

## 2020-03-20 PROCEDURE — 85025 COMPLETE CBC W/AUTO DIFF WBC: CPT

## 2020-03-20 PROCEDURE — 45385 COLONOSCOPY W/LESION REMOVAL: CPT | Mod: ,,, | Performed by: SURGERY

## 2020-03-20 PROCEDURE — 45384 COLONOSCOPY W/LESION REMOVAL: CPT | Performed by: SURGERY

## 2020-03-20 PROCEDURE — 36415 COLL VENOUS BLD VENIPUNCTURE: CPT

## 2020-03-20 PROCEDURE — 43239 EGD BIOPSY SINGLE/MULTIPLE: CPT | Performed by: SURGERY

## 2020-03-20 PROCEDURE — 88305 TISSUE EXAM BY PATHOLOGIST: CPT | Mod: 59 | Performed by: PATHOLOGY

## 2020-03-20 PROCEDURE — 45385 PR COLONOSCOPY,REMV LESN,SNARE: ICD-10-PCS | Mod: ,,, | Performed by: SURGERY

## 2020-03-20 PROCEDURE — 80053 COMPREHEN METABOLIC PANEL: CPT

## 2020-03-20 PROCEDURE — 45381 PR COLONOSCPY,FLEX,W/DIR SUBMUC INJECT: ICD-10-PCS | Mod: 51,,, | Performed by: SURGERY

## 2020-03-20 PROCEDURE — 63600175 PHARM REV CODE 636 W HCPCS: Performed by: NURSE ANESTHETIST, CERTIFIED REGISTERED

## 2020-03-20 PROCEDURE — D9220A PRA ANESTHESIA: Mod: ANES,,, | Performed by: ANESTHESIOLOGY

## 2020-03-20 PROCEDURE — 45381 COLONOSCOPY SUBMUCOUS NJX: CPT | Performed by: SURGERY

## 2020-03-20 PROCEDURE — D9220A PRA ANESTHESIA: Mod: CRNA,,, | Performed by: NURSE ANESTHETIST, CERTIFIED REGISTERED

## 2020-03-20 PROCEDURE — 88305 TISSUE EXAM BY PATHOLOGIST: CPT | Mod: 26,,, | Performed by: PATHOLOGY

## 2020-03-20 PROCEDURE — 45385 COLONOSCOPY W/LESION REMOVAL: CPT | Performed by: SURGERY

## 2020-03-20 PROCEDURE — 45381 COLONOSCOPY SUBMUCOUS NJX: CPT | Mod: 51,,, | Performed by: SURGERY

## 2020-03-20 PROCEDURE — 37000008 HC ANESTHESIA 1ST 15 MINUTES: Performed by: SURGERY

## 2020-03-20 PROCEDURE — 43239 EGD BIOPSY SINGLE/MULTIPLE: CPT | Mod: 51,,, | Performed by: SURGERY

## 2020-03-20 PROCEDURE — 63600175 PHARM REV CODE 636 W HCPCS: Performed by: ANESTHESIOLOGY

## 2020-03-20 PROCEDURE — S0028 INJECTION, FAMOTIDINE, 20 MG: HCPCS

## 2020-03-20 PROCEDURE — 25000003 PHARM REV CODE 250

## 2020-03-20 PROCEDURE — D9220A PRA ANESTHESIA: ICD-10-PCS | Mod: ANES,,, | Performed by: ANESTHESIOLOGY

## 2020-03-20 PROCEDURE — 45382 COLONOSCOPY W/CONTROL BLEED: CPT | Performed by: SURGERY

## 2020-03-20 PROCEDURE — 88305 TISSUE EXAM BY PATHOLOGIST: ICD-10-PCS | Mod: 26,,, | Performed by: PATHOLOGY

## 2020-03-20 PROCEDURE — D9220A PRA ANESTHESIA: ICD-10-PCS | Mod: CRNA,,, | Performed by: NURSE ANESTHETIST, CERTIFIED REGISTERED

## 2020-03-20 PROCEDURE — 25000003 PHARM REV CODE 250: Performed by: NURSE ANESTHETIST, CERTIFIED REGISTERED

## 2020-03-20 PROCEDURE — 43239 PR EGD, FLEX, W/BIOPSY, SGL/MULTI: ICD-10-PCS | Mod: 51,,, | Performed by: SURGERY

## 2020-03-20 PROCEDURE — 27201423 OPTIME MED/SURG SUP & DEVICES STERILE SUPPLY: Performed by: SURGERY

## 2020-03-20 RX ORDER — SODIUM CHLORIDE 9 MG/ML
INJECTION, SOLUTION INTRAVENOUS CONTINUOUS
Status: DISCONTINUED | OUTPATIENT
Start: 2020-03-20 | End: 2020-03-20 | Stop reason: HOSPADM

## 2020-03-20 RX ORDER — FAMOTIDINE 10 MG/ML
INJECTION INTRAVENOUS
Status: COMPLETED
Start: 2020-03-20 | End: 2020-03-20

## 2020-03-20 RX ORDER — GLYCOPYRROLATE 0.2 MG/ML
INJECTION INTRAMUSCULAR; INTRAVENOUS
Status: DISCONTINUED | OUTPATIENT
Start: 2020-03-20 | End: 2020-03-20

## 2020-03-20 RX ORDER — MIDAZOLAM HYDROCHLORIDE 1 MG/ML
INJECTION, SOLUTION INTRAMUSCULAR; INTRAVENOUS
Status: DISCONTINUED | OUTPATIENT
Start: 2020-03-20 | End: 2020-03-20

## 2020-03-20 RX ORDER — LIDOCAINE HYDROCHLORIDE 10 MG/ML
1 INJECTION, SOLUTION EPIDURAL; INFILTRATION; INTRACAUDAL; PERINEURAL ONCE
Status: DISCONTINUED | OUTPATIENT
Start: 2020-03-20 | End: 2020-03-20 | Stop reason: HOSPADM

## 2020-03-20 RX ORDER — LIDOCAINE HYDROCHLORIDE 20 MG/ML
INJECTION, SOLUTION EPIDURAL; INFILTRATION; INTRACAUDAL; PERINEURAL
Status: DISCONTINUED | OUTPATIENT
Start: 2020-03-20 | End: 2020-03-20

## 2020-03-20 RX ORDER — SODIUM CHLORIDE, SODIUM LACTATE, POTASSIUM CHLORIDE, CALCIUM CHLORIDE 600; 310; 30; 20 MG/100ML; MG/100ML; MG/100ML; MG/100ML
125 INJECTION, SOLUTION INTRAVENOUS CONTINUOUS
Status: DISCONTINUED | OUTPATIENT
Start: 2020-03-20 | End: 2020-03-20 | Stop reason: HOSPADM

## 2020-03-20 RX ORDER — ONDANSETRON 2 MG/ML
4 INJECTION INTRAMUSCULAR; INTRAVENOUS DAILY PRN
Status: DISCONTINUED | OUTPATIENT
Start: 2020-03-20 | End: 2020-03-20 | Stop reason: HOSPADM

## 2020-03-20 RX ORDER — FAMOTIDINE 10 MG/ML
20 INJECTION INTRAVENOUS 2 TIMES DAILY
Status: DISCONTINUED | OUTPATIENT
Start: 2020-03-20 | End: 2020-03-20 | Stop reason: HOSPADM

## 2020-03-20 RX ORDER — SODIUM CHLORIDE, SODIUM LACTATE, POTASSIUM CHLORIDE, CALCIUM CHLORIDE 600; 310; 30; 20 MG/100ML; MG/100ML; MG/100ML; MG/100ML
INJECTION, SOLUTION INTRAVENOUS CONTINUOUS
Status: DISCONTINUED | OUTPATIENT
Start: 2020-03-20 | End: 2020-03-20 | Stop reason: HOSPADM

## 2020-03-20 RX ORDER — DIPHENHYDRAMINE HYDROCHLORIDE 50 MG/ML
12.5 INJECTION INTRAMUSCULAR; INTRAVENOUS
Status: DISCONTINUED | OUTPATIENT
Start: 2020-03-20 | End: 2020-03-20 | Stop reason: HOSPADM

## 2020-03-20 RX ORDER — PROPOFOL 10 MG/ML
VIAL (ML) INTRAVENOUS
Status: DISCONTINUED | OUTPATIENT
Start: 2020-03-20 | End: 2020-03-20

## 2020-03-20 RX ADMIN — PROPOFOL 20 MG: 10 INJECTION, EMULSION INTRAVENOUS at 09:03

## 2020-03-20 RX ADMIN — LIDOCAINE HYDROCHLORIDE 100 MG: 20 INJECTION, SOLUTION EPIDURAL; INFILTRATION; INTRACAUDAL; PERINEURAL at 09:03

## 2020-03-20 RX ADMIN — PROPOFOL 100 MG: 10 INJECTION, EMULSION INTRAVENOUS at 09:03

## 2020-03-20 RX ADMIN — FAMOTIDINE 20 MG: 10 INJECTION INTRAVENOUS at 08:03

## 2020-03-20 RX ADMIN — PROPOFOL 20 MG: 10 INJECTION, EMULSION INTRAVENOUS at 10:03

## 2020-03-20 RX ADMIN — MIDAZOLAM HYDROCHLORIDE 2 MG: 1 INJECTION, SOLUTION INTRAMUSCULAR; INTRAVENOUS at 09:03

## 2020-03-20 RX ADMIN — SODIUM CHLORIDE, SODIUM LACTATE, POTASSIUM CHLORIDE, AND CALCIUM CHLORIDE: .6; .31; .03; .02 INJECTION, SOLUTION INTRAVENOUS at 08:03

## 2020-03-20 RX ADMIN — GLYCOPYRROLATE 0.2 MG: 0.2 INJECTION INTRAMUSCULAR; INTRAVENOUS at 09:03

## 2020-03-20 NOTE — [employer] PATIENT INSTRUCTIONS
Discharge Summary/Instructions after an Endoscopic Procedure  Patient Name: Kellie Monte  Patient MRN: [id number]  Patient YOB: 1945  Friday, March 20, 2020  Omar Ramirez MD  RESTRICTIONS:  During your procedure today, you received medications for sedation.  These   medications may affect your judgment, balance and coordination.  Therefore,   for 24 hours, you have the following restrictions:   - DO NOT drive a car, operate machinery, make legal/financial decisions,   sign important papers or drink alcohol.    ACTIVITY:  Today: no heavy lifting, straining or running due to procedural   sedation/anesthesia.  The following day: return to full activity including work.  DIET:  Eat and drink normally unless instructed otherwise.     TREATMENT FOR COMMON SIDE EFFECTS:  - Mild abdominal pain, nausea, belching, bloating or excessive gas:  rest,   eat lightly and use a heating pad.  - Sore Throat: treat with throat lozenges and/or gargle with warm salt   water.  - Because air was used during the procedure, expelling large amounts of air   from your rectum or belching is normal.  - If a bowel prep was taken, you may not have a bowel movement for 1-3 days.    This is normal.  SYMPTOMS TO WATCH FOR AND REPORT TO YOUR PHYSICIAN:  1. Abdominal pain or bloating, other than gas cramps.  2. Chest pain.  3. Back pain.  4. Signs of infection such as: chills or fever occurring within 24 hours   after the procedure.  5. Rectal bleeding, which would show as bright red, maroon, or black stools.   (A tablespoon of blood from the rectum is not serious, especially if   hemorrhoids are present.)  6. Vomiting.  7. Weakness or dizziness.  GO DIRECTLY TO THE NEAREST EMERGENCY ROOM IF YOU HAVE ANY OF THE FOLLOWING:      Difficulty breathing              Chills and/or fever over 101 F   Persistent vomiting and/or vomiting blood   Severe abdominal pain   Severe chest pain   Black, tarry stools   Bleeding- more than one  tablespoon   Any other symptom or condition that you feel may need urgent attention  Your doctor recommends these additional instructions:  If any biopsies were taken, your doctors clinic will contact you in 1 to 2   weeks with any results.  - Discharge patient to home.   - Resume previous diet.   - Continue present medications.   - Await pathology results.   - Return to my office in 2 weeks.   - See EGD Report  - Further recommendations will depend on clinical course  - Patient has a contact number available for emergencies.  The signs and   symptoms of potential delayed complications were discussed with the   patient.  Return to normal activities tomorrow.  Written discharge   instructions were provided to the patient.  For questions, problems or results please call your physician - Omar Ramirez MD at Work:  [phone].  [facility] EMERGENCY ROOM PHONE NUMBER: [phone]  IF A COMPLICATION OR EMERGENCY SITUATION ARISES AND YOU ARE UNABLE TO REACH   YOUR PHYSICIAN - GO DIRECTLY TO THE EMERGENCY ROOM.  MD Omar Pickett MD  3/20/2020 10:43:03 AM  This report has been verified and signed electronically.  [employer]

## 2020-03-20 NOTE — DISCHARGE SUMMARY
[facility]  Discharge Note  Short Stay    Procedure(s) (LRB):  COLONOSCOPY (N/A)  ESOPHAGOGASTRODUODENOSCOPY (EGD) (N/A)    OUTCOME: Patient tolerated treatment/procedure well without complication and is now ready for discharge.    DISPOSITION: Home or Self Care    FINAL DIAGNOSIS:  Hematochezia.  Sigmoid polyp.  Internal hemorrhoids.  Gastritis.    FOLLOWUP: In clinic    DISCHARGE INSTRUCTIONS:    Discharge Procedure Orders   Diet Adult Regular     No driving until:     Order Specific Question Answer Comments   Date: 3/21/2020         Clinical Reference Documents Added to Patient Instructions       Document    COLON AND RECTAL POLYPS, UNDERSTANDING (ENGLISH)

## 2020-03-20 NOTE — INTERVAL H&P NOTE
The patient has been examined and the H&P has been reviewed:    I concur with the findings and changes have been noted since the H&P was written: She stopped her Xarelto on 3/16/2020.  She has had any melena, hematochezia, or hematemesis since 03/11/20.     CBC and CMP pending.  These will be reviewed prior to discharge this morning following endoscopy.    EKG reviewed from 3/17/2020.  Twelve lead tracing showed a normal sinus rhythm and no evidence of any ischemic changes.    Surgery risks, benefits and alternative options discussed and understood by patient/family.    No evident contraindication to proceeding with planned endoscopy today.          Active Hospital Problems    Diagnosis  POA    Hematochezia [K92.1]  Yes      Resolved Hospital Problems   No resolved problems to display.

## 2020-03-20 NOTE — ANESTHESIA POSTPROCEDURE EVALUATION
Anesthesia Post Evaluation    Patient: Kellie Monte    Procedure(s) Performed: Procedure(s) (LRB):  COLONOSCOPY (N/A)  ESOPHAGOGASTRODUODENOSCOPY (EGD) (N/A)    Final Anesthesia Type: general    Patient location during evaluation: PACU  Patient participation: Yes- Able to Participate  Level of consciousness: awake and alert  Post-procedure vital signs: reviewed and stable  Pain management: adequate  Airway patency: patent    PONV status at discharge: No PONV  Anesthetic complications: no      Cardiovascular status: blood pressure returned to baseline  Respiratory status: unassisted  Hydration status: euvolemic  Follow-up not needed.          Vitals Value Taken Time   /85 3/20/2020 11:17 AM   Temp 36.8 °C (98.2 °F) 3/20/2020  8:26 AM   Pulse 61 3/20/2020 11:16 AM   Resp 10 3/20/2020 11:16 AM   SpO2 100 % 3/20/2020 11:16 AM   Vitals shown include unvalidated device data.      Event Time     Out of Recovery 10:49:15          Pain/Reggie Score: Reggie Score: 10 (3/20/2020 11:15 AM)  Modified Reggie Score: 19 (3/20/2020 11:15 AM)

## 2020-03-20 NOTE — TRANSFER OF CARE
"Anesthesia Transfer of Care Note    Patient: Kellie Monte    Procedure(s) Performed: Procedure(s) (LRB):  COLONOSCOPY (N/A)  ESOPHAGOGASTRODUODENOSCOPY (EGD) (N/A)    Patient location: PACU    Anesthesia Type: general    Transport from OR: Transported from OR on room air with adequate spontaneous ventilation    Post pain: adequate analgesia    Post assessment: no apparent anesthetic complications and tolerated procedure well    Post vital signs: stable    Level of consciousness: awake, alert and oriented    Nausea/Vomiting: no nausea/vomiting    Complications: none    Transfer of care protocol was followed      Last vitals:   Visit Vitals  /77   Pulse 69   Temp 36.8 °C (98.2 °F) (Oral)   Resp 16   Ht 5' 9" (1.753 m)   Wt 74.8 kg (165 lb)   SpO2 95%   Breastfeeding? No   BMI 24.37 kg/m²     "

## 2020-03-20 NOTE — ANESTHESIA PREPROCEDURE EVALUATION
03/20/2020  Kelile Monte is a 74 y.o., female.    Anesthesia Evaluation    I have reviewed the Patient Summary Reports.    I have reviewed the Nursing Notes.   I have reviewed the Medications.     Review of Systems  Social:  Former Smoker    Hematology/Oncology:  Hematology Normal   Oncology Normal    -- Hypercoagulable state - Immunodeficiency Disorder: Factor V Leiden    EENT/Dental:EENT/Dental Normal   Cardiovascular:  Cardiovascular Normal     Pulmonary:  Pulmonary Normal    Renal/:  Renal/ Normal     Hepatic/GI:  Hepatic/GI Normal    Musculoskeletal:   Arthritis     Neurological:  Neurology Normal    Endocrine:  Endocrine Normal        Physical Exam  General:  Well nourished    Airway/Jaw/Neck:  Airway Findings: Mouth Opening: Normal Tongue: Normal  General Airway Assessment: Adult  Mallampati: II       Chest/Lungs:  Chest/Lungs Findings: Clear to auscultation     Heart/Vascular:  Heart Findings: Rate: Normal  Rhythm: Regular Rhythm        Mental Status:  Mental Status Findings:  Cooperative, Alert and Oriented         Anesthesia Plan  Type of Anesthesia, risks & benefits discussed:  Anesthesia Type:  general  Patient's Preference:   Intra-op Monitoring Plan:   Intra-op Monitoring Plan Comments:   Post Op Pain Control Plan: IV/PO Opioids PRN  Post Op Pain Control Plan Comments:   Induction:   IV  Beta Blocker:  Patient is not currently on a Beta-Blocker (No further documentation required).       Informed Consent: Patient understands risks and agrees with Anesthesia plan.  Questions answered. Anesthesia consent signed with patient.  ASA Score: 3     Day of Surgery Review of History & Physical: I have interviewed and examined the patient. I have reviewed the patient's H&P dated:            Ready For Surgery From Anesthesia Perspective.

## 2020-03-20 NOTE — PLAN OF CARE
"Pt refuse to wait to speak with Dr. Paiz, pt states" Please have him to call if needed, MD notified  "

## 2020-03-20 NOTE — PLAN OF CARE
Pt arrives to PACU via stretcher able to  follow simple commands. Monitor connected. PIV intact and infusing LR'S to gravity. VSS, no complaints will continue to monitor.

## 2020-03-20 NOTE — DISCHARGE INSTRUCTIONS

## 2020-03-20 NOTE — [employer] PATIENT INSTRUCTIONS
Discharge Summary/Instructions after an Endoscopic Procedure  Patient Name: Kellie Monte  Patient MRN: [id number]  Patient YOB: 1945  Friday, March 20, 2020  Omar Ramirez MD  RESTRICTIONS:  During your procedure today, you received medications for sedation.  These   medications may affect your judgment, balance and coordination.  Therefore,   for 24 hours, you have the following restrictions:   - DO NOT drive a car, operate machinery, make legal/financial decisions,   sign important papers or drink alcohol.    ACTIVITY:  Today: no heavy lifting, straining or running due to procedural   sedation/anesthesia.  The following day: return to full activity including work.  DIET:  Eat and drink normally unless instructed otherwise.     TREATMENT FOR COMMON SIDE EFFECTS:  - Mild abdominal pain, nausea, belching, bloating or excessive gas:  rest,   eat lightly and use a heating pad.  - Sore Throat: treat with throat lozenges and/or gargle with warm salt   water.  - Because air was used during the procedure, expelling large amounts of air   from your rectum or belching is normal.  - If a bowel prep was taken, you may not have a bowel movement for 1-3 days.    This is normal.  SYMPTOMS TO WATCH FOR AND REPORT TO YOUR PHYSICIAN:  1. Abdominal pain or bloating, other than gas cramps.  2. Chest pain.  3. Back pain.  4. Signs of infection such as: chills or fever occurring within 24 hours   after the procedure.  5. Rectal bleeding, which would show as bright red, maroon, or black stools.   (A tablespoon of blood from the rectum is not serious, especially if   hemorrhoids are present.)  6. Vomiting.  7. Weakness or dizziness.  GO DIRECTLY TO THE NEAREST EMERGENCY ROOM IF YOU HAVE ANY OF THE FOLLOWING:      Difficulty breathing              Chills and/or fever over 101 F   Persistent vomiting and/or vomiting blood   Severe abdominal pain   Severe chest pain   Black, tarry stools   Bleeding- more than one  tablespoon   Any other symptom or condition that you feel may need urgent attention  Your doctor recommends these additional instructions:  If any biopsies were taken, your doctors clinic will contact you in 1 to 2   weeks with any results.  - Discharge patient to home.   - Resume previous diet.   - Continue present medications.   - Resume Xarelto on 3/24/2020.  - Continue present medications.   - Continue Protonix  - Await pathology results.   - Treat Helicobacter if present  - Review Colonoscopy Report  - Further recommendations will depend on clinical course  - Patient has a contact number available for emergencies.  The signs and   symptoms of potential delayed complications were discussed with the   patient.  Return to normal activities tomorrow.  Written discharge   instructions were provided to the patient.  For questions, problems or results please call your physician - Omar Ramirez MD at Work:  [phone].  [facility] EMERGENCY ROOM PHONE NUMBER: [phone]  IF A COMPLICATION OR EMERGENCY SITUATION ARISES AND YOU ARE UNABLE TO REACH   YOUR PHYSICIAN - GO DIRECTLY TO THE EMERGENCY ROOM.  MD Omar Pickett MD  3/20/2020 10:32:38 AM  This report has been verified and signed electronically.  [employer]

## 2020-03-25 LAB
FINAL PATHOLOGIC DIAGNOSIS: NORMAL
GROSS: NORMAL

## 2020-05-13 ENCOUNTER — HOSPITAL ENCOUNTER (OUTPATIENT)
Dept: RADIOLOGY | Facility: HOSPITAL | Age: 75
Discharge: HOME | End: 2020-05-13
Attending: NURSE PRACTITIONER
Payer: MEDICARE

## 2020-05-13 DIAGNOSIS — D68.51 FACTOR 5 LEIDEN MUTATION, HETEROZYGOUS: ICD-10-CM

## 2020-05-13 DIAGNOSIS — R20.2 PARESTHESIA OF LEFT FOOT: ICD-10-CM

## 2020-05-13 DIAGNOSIS — M79.89 PAIN AND SWELLING OF LEFT LOWER EXTREMITY: ICD-10-CM

## 2020-05-13 DIAGNOSIS — M79.605 PAIN AND SWELLING OF LEFT LOWER EXTREMITY: ICD-10-CM

## 2020-05-13 PROCEDURE — 93970 EXTREMITY STUDY: CPT | Mod: TC,PN

## 2020-05-13 PROCEDURE — 93970 EXTREMITY STUDY: CPT | Mod: 26,,, | Performed by: RADIOLOGY

## 2020-05-13 PROCEDURE — 93970 US LOWER EXTREMITY VEINS BILATERAL: ICD-10-PCS | Mod: 26,,, | Performed by: RADIOLOGY

## 2020-05-18 ENCOUNTER — TELEPHONE (OUTPATIENT)
Dept: HEMATOLOGY/ONCOLOGY | Facility: CLINIC | Age: 75
End: 2020-05-18

## 2020-05-18 DIAGNOSIS — D68.51 FACTOR 5 LEIDEN MUTATION, HETEROZYGOUS: Primary | ICD-10-CM

## 2020-05-27 ENCOUNTER — LAB VISIT (OUTPATIENT)
Dept: LAB | Facility: HOSPITAL | Age: 75
End: 2020-05-27
Attending: INTERNAL MEDICINE
Payer: MEDICARE

## 2020-05-27 DIAGNOSIS — D68.51 FACTOR 5 LEIDEN MUTATION, HETEROZYGOUS: ICD-10-CM

## 2020-05-27 LAB
ALBUMIN SERPL BCP-MCNC: 4.1 G/DL (ref 3.5–5.2)
ALP SERPL-CCNC: 63 U/L (ref 55–135)
ALT SERPL W/O P-5'-P-CCNC: 21 U/L (ref 10–44)
ANION GAP SERPL CALC-SCNC: 9 MMOL/L (ref 8–16)
APTT BLDCRRT: 31.1 SEC (ref 21–32)
AST SERPL-CCNC: 23 U/L (ref 10–40)
BASOPHILS # BLD AUTO: 0.05 K/UL (ref 0–0.2)
BASOPHILS NFR BLD: 0.9 % (ref 0–1.9)
BILIRUB SERPL-MCNC: 0.4 MG/DL (ref 0.1–1)
BUN SERPL-MCNC: 17 MG/DL (ref 8–23)
CALCIUM SERPL-MCNC: 8.9 MG/DL (ref 8.7–10.5)
CHLORIDE SERPL-SCNC: 105 MMOL/L (ref 95–110)
CO2 SERPL-SCNC: 23 MMOL/L (ref 23–29)
CREAT SERPL-MCNC: 0.9 MG/DL (ref 0.5–1.4)
DIFFERENTIAL METHOD: NORMAL
EOSINOPHIL # BLD AUTO: 0.1 K/UL (ref 0–0.5)
EOSINOPHIL NFR BLD: 2 % (ref 0–8)
ERYTHROCYTE [DISTWIDTH] IN BLOOD BY AUTOMATED COUNT: 14 % (ref 11.5–14.5)
EST. GFR  (AFRICAN AMERICAN): >60 ML/MIN/1.73 M^2
EST. GFR  (NON AFRICAN AMERICAN): >60 ML/MIN/1.73 M^2
FACT X PPP CHRO-ACNC: 1.12 IU/ML (ref 0.3–0.7)
GLUCOSE SERPL-MCNC: 91 MG/DL (ref 70–110)
HCT VFR BLD AUTO: 40.7 % (ref 37–48.5)
HGB BLD-MCNC: 13.5 G/DL (ref 12–16)
IMM GRANULOCYTES # BLD AUTO: 0.03 K/UL (ref 0–0.04)
IMM GRANULOCYTES NFR BLD AUTO: 0.5 % (ref 0–0.5)
INR PPP: 1.5 (ref 0.8–1.2)
LYMPHOCYTES # BLD AUTO: 2.5 K/UL (ref 1–4.8)
LYMPHOCYTES NFR BLD: 45.4 % (ref 18–48)
MCH RBC QN AUTO: 29.5 PG (ref 27–31)
MCHC RBC AUTO-ENTMCNC: 33.2 G/DL (ref 32–36)
MCV RBC AUTO: 89 FL (ref 82–98)
MONOCYTES # BLD AUTO: 0.5 K/UL (ref 0.3–1)
MONOCYTES NFR BLD: 9 % (ref 4–15)
NEUTROPHILS # BLD AUTO: 2.3 K/UL (ref 1.8–7.7)
NEUTROPHILS NFR BLD: 42.2 % (ref 38–73)
NRBC BLD-RTO: 0 /100 WBC
PLATELET # BLD AUTO: 193 K/UL (ref 150–350)
PMV BLD AUTO: 9.8 FL (ref 9.2–12.9)
POTASSIUM SERPL-SCNC: 4.3 MMOL/L (ref 3.5–5.1)
PROT SERPL-MCNC: 7.1 G/DL (ref 6–8.4)
PROTHROMBIN TIME: 16.1 SEC (ref 9–12.5)
RBC # BLD AUTO: 4.58 M/UL (ref 4–5.4)
SODIUM SERPL-SCNC: 137 MMOL/L (ref 136–145)
WBC # BLD AUTO: 5.46 K/UL (ref 3.9–12.7)

## 2020-05-27 PROCEDURE — 85520 HEPARIN ASSAY: CPT

## 2020-05-27 PROCEDURE — 85025 COMPLETE CBC W/AUTO DIFF WBC: CPT

## 2020-05-27 PROCEDURE — 85730 THROMBOPLASTIN TIME PARTIAL: CPT

## 2020-05-27 PROCEDURE — 85610 PROTHROMBIN TIME: CPT

## 2020-05-27 PROCEDURE — 36415 COLL VENOUS BLD VENIPUNCTURE: CPT

## 2020-05-27 PROCEDURE — 80053 COMPREHEN METABOLIC PANEL: CPT

## 2020-06-03 ENCOUNTER — HOSPITAL ENCOUNTER (OUTPATIENT)
Dept: RADIOLOGY | Facility: HOSPITAL | Age: 75
Discharge: HOME | End: 2020-06-03
Attending: NURSE PRACTITIONER
Payer: MEDICARE

## 2020-06-03 ENCOUNTER — OFFICE VISIT (OUTPATIENT)
Dept: HEMATOLOGY/ONCOLOGY | Facility: CLINIC | Age: 75
End: 2020-06-03
Payer: MEDICARE

## 2020-06-03 DIAGNOSIS — M85.80 OSTEOPENIA, UNSPECIFIED LOCATION: ICD-10-CM

## 2020-06-03 DIAGNOSIS — Z86.711 HISTORY OF PULMONARY EMBOLISM: ICD-10-CM

## 2020-06-03 DIAGNOSIS — D68.51 FACTOR 5 LEIDEN MUTATION, HETEROZYGOUS: ICD-10-CM

## 2020-06-03 DIAGNOSIS — E78.00 HYPERCHOLESTEREMIA: ICD-10-CM

## 2020-06-03 DIAGNOSIS — I82.812 CHRONIC SUPERFICIAL VENOUS THROMBOSIS OF LEFT LOWER EXTREMITY: ICD-10-CM

## 2020-06-03 DIAGNOSIS — M79.605 PAIN AND SWELLING OF LEFT LOWER EXTREMITY: ICD-10-CM

## 2020-06-03 DIAGNOSIS — Z79.01 ANTICOAGULATED: ICD-10-CM

## 2020-06-03 DIAGNOSIS — R20.2 PARESTHESIA: ICD-10-CM

## 2020-06-03 DIAGNOSIS — M79.89 PAIN AND SWELLING OF LEFT LOWER EXTREMITY: ICD-10-CM

## 2020-06-03 DIAGNOSIS — I82.812 SUPERFICIAL THROMBOSIS OF LEFT LOWER EXTREMITY: ICD-10-CM

## 2020-06-03 DIAGNOSIS — Z86.718 HISTORY OF DVT (DEEP VEIN THROMBOSIS): ICD-10-CM

## 2020-06-03 DIAGNOSIS — D68.51 FACTOR V LEIDEN MUTATION: Primary | ICD-10-CM

## 2020-06-03 PROCEDURE — 99205 OFFICE O/P NEW HI 60 MIN: CPT | Mod: 95,,, | Performed by: INTERNAL MEDICINE

## 2020-06-03 PROCEDURE — 93925 US LOWER EXTREMITY ARTERIES BILATERAL: ICD-10-PCS | Mod: 26,,, | Performed by: RADIOLOGY

## 2020-06-03 PROCEDURE — 93925 LOWER EXTREMITY STUDY: CPT | Mod: 26,,, | Performed by: RADIOLOGY

## 2020-06-03 PROCEDURE — 99205 PR OFFICE/OUTPT VISIT, NEW, LEVL V, 60-74 MIN: ICD-10-PCS | Mod: 95,,, | Performed by: INTERNAL MEDICINE

## 2020-06-03 PROCEDURE — 93925 LOWER EXTREMITY STUDY: CPT | Mod: TC

## 2020-06-03 NOTE — LETTER
Bruna 3, 2020      Jessica Wilson MD  [address]  [address]  [place] [address]           [facility]  [address] [place] [address]  Phone: [phone]          Patient: Kellie Monte   MR Number: [id number]   YOB: 1945   Date of Visit: 6/3/2020       Dear Dr. Jessica Wilson:    Thank you for referring Kellie Monte to me for evaluation. Attached you will find relevant portions of my assessment and plan of care.    If you have questions, please do not hesitate to call me. I look forward to following Kellie Monte along with you.    Sincerely,    Concepcion Starr MD    Enclosure  CC:  No Recipients    If you would like to receive this communication electronically, please contact [email or web address] or [phone] to request more information on [employer] access.    For providers and/or their staff who would like to refer a patient to [facility], please contact us through our one-stop-shop provider referral line, [facility], at [phone].    If you feel you have received this communication in error or would no longer like to receive these types of communications, please e-mail [email or web address]

## 2020-06-03 NOTE — PROGRESS NOTES
The patient location is:  At home with [employer]  The chief complaint leading to consultation is:  How are my blood counts  Patient has consented to this visit via televisit  Visit type: Virtual visit with synchronous audio and video plus interrupted audio   Total time spent with patient: over 50 min with more than 90% on medical discussion, counseling and coordination of care.  Each patient to whom he or she provides medical services by telemedicine is:  (1) informed of the relationship between the physician and patient and the respective role of any other health care provider with respect to management of the patient; and (2) notified that he or she may decline to receive medical services by telemedicine and may withdraw from such care at any time. The reason for the audio only service rather than synchronous audio and video virtual visit was related to technical difficulties or patient preference/necessity.  This visit is to also involve counseling, patient education, informed consent for ordered diagnostic and laboratory tests and motivational interviewing    This document has been created using [employer] dictation software and free typing.  It has been checked for errors but some errors may still exist.      CC New left leg redness from knee down to ankle that goes away when she wears her stocking  , no increase in swelling     Kellie Monte is a 74 y.o.  This is a 74 year old female with a history of DVT in leg and PE in her early twenties. SHe has been found to have factor leiden mutation. She ahs been on Xarelto without complications.  In May she underwent a colonoscopy and her Xarelto was held for a few days.  After this procedure patient noticed increased swelling in her left leg and was sent for an ultrasound which revealed chronic nonocclusive thrombus involving the left superficial femoral vein.  She states over the past couple of days she has this waxing and waning of redness involving her left leg.  The  swelling has dissipated since resuming her Xarelto post procedure.  She is not experiencing any paresthesias but she does state the collar dissipates  once she wears her compression stockings.  She is scheduled to undergo an ultrasound of the arteries in her left leg later today    Miscellaneous: None      Impression       1. No ultrasound evidence to suggest a deep venous thrombosis of the right lower extremity.  2. 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  This report was flagged in Epic as abnormal.      Electronically signed by: Lb Gonsalez  Date: 05/13/2020  Time: 16:15         Past Medical History:   Diagnosis Date    Blood clot in vein     Deep vein thrombosis     while pregnant in 1972, subsequent to that while on ocp, then 2 years ago. Recommended to have lifelong anticoag at [facility].    DVT (deep venous thrombosis) 1972    While pregnant.    Factor 5 Leiden mutation, heterozygous     Gastritis 03/20/2020    Hematochezia 01/2020    History of DVT (deep vein thrombosis) 8/20/2019    Hypercholesterolemia     Internal hemorrhoids     OP (osteoporosis)     Osteoarthritis     Pulmonary embolism     Sigmoid polyp 03/20/2020     Past Surgical History:   Procedure Laterality Date    APPENDECTOMY      CHOLECYSTECTOMY      COLONOSCOPY  8/07     Jt    COLONOSCOPY N/A 3/20/2020    Procedure: COLONOSCOPY;  Surgeon: Omar Ramirez MD;  [facility];  Service: General;  Laterality: N/A;    cyst removed ENT (throat)      ESOPHAGOGASTRODUODENOSCOPY N/A 3/20/2020    Procedure: ESOPHAGOGASTRODUODENOSCOPY (EGD);  Surgeon: Omar Ramirez MD;  [facility];  Service: General;  Laterality: N/A;    INNER EAR SURGERY Left     [employer] Implant    SKIN CANCER EXCISION      tonsils      TUBAL LIGATION         Current Outpatient Medications:     atorvastatin (LIPITOR) 10 MG tablet, Take 1 tablet (10 mg total) by mouth nightly. (Patient not taking: Reported  on 2020), Disp: 90 tablet, Rfl: 1    diclofenac sodium (VOLTAREN) 1 % Gel, Apply 2 g topically once daily., Disp: , Rfl:     multivit-mineral-iron-lutein (CENTRUM SILVER ULTRA WOMEN'S) Tab, Take 1 tablet by mouth every morning., Disp: , Rfl:     pantoprazole (PROTONIX) 40 MG tablet, Take 1 tablet (40 mg total) by mouth once daily., Disp: 30 tablet, Rfl: 5    rivaroxaban (XARELTO) 20 mg Tab, Take 20 mg by mouth once daily., Disp: , Rfl:     tramadol (ULTRAM) 50 mg tablet, Take 1 tablet (50 mg total) by mouth every 4 (four) hours as needed for Pain., Disp: 40 tablet, Rfl: 3  Review of patient's allergies indicates:  No Known Allergies  Social History     Tobacco Use    Smoking status: Former Smoker     Packs/day: 2.00     Years: 12.00     Pack years: 24.00     Types: Cigarettes     Last attempt to quit: 1982     Years since quittin.3    Smokeless tobacco: Never Used   Substance Use Topics    Alcohol use: Yes     Alcohol/week: 1.0 standard drinks     Types: 1 Glasses of wine per week     Comment: socially    Drug use: No     Family History   Problem Relation Age of Onset    Cancer Father 75        bone    Clotting disorder Father     Cancer Mother 80        colon    Colon cancer Mother     Angina Mother     Clotting disorder Mother     Clotting disorder Brother     Heart disease Maternal Grandmother     Heart disease Paternal Grandmother        CONSTITUTIONAL: No fevers, chills, night sweats, wt. loss, appetite changes  SKIN:  Change in coloring to her left leg  ENT: No headaches, head trauma, vision changes, or eye pain  LYMPH NODES: None noticed   CV: No chest pain, palpitations.   RESP: No dyspnea on exertion, cough, wheezing, or hemoptysis  GI: No nausea, emesis, diarrhea, constipation, melena, hematochezia, pain.   : No dysuria, hematuria, urgency, or frequency   HEME: No easy bruising, bleeding problems  PSYCHIATRIC: No depression, anxiety, psychosis, hallucinations.  NEURO: No  seizures, memory loss, dizziness or difficulty sleeping  MSK: No arthralgias          General patient is in no distress well developed well nourished  Psych pleasant affect no evidence of depression no evidence of anxiety  Head normocephalic atraumatic, no hoarseness,  well-spoken speech intact  Eyes noninjected sclera conjunctiva appear pink  Face is symmetric  Skin intact no lesions noted no ecchymosis no rash  Neck with no limited range of motion no JVD evident  Chest with no use of accessory muscles no labored breathing no evidence of tachypnea  No respiratory distress, effort normal   Abdomen appears to be nondistended  Extremities with no cyanosis    Neuro muscular cranial nerves appear grossly intact  Gait appears steady  No joint effusions  Behavior normal judgment normal      Lab Results   Component Value Date    WBC 5.46 05/27/2020    HGB 13.5 05/27/2020    HCT 40.7 05/27/2020    MCV 89 05/27/2020     05/27/2020         CMP  Sodium   Date Value Ref Range Status   05/27/2020 137 136 - 145 mmol/L Final     Potassium   Date Value Ref Range Status   05/27/2020 4.3 3.5 - 5.1 mmol/L Final     Chloride   Date Value Ref Range Status   05/27/2020 105 95 - 110 mmol/L Final     CO2   Date Value Ref Range Status   05/27/2020 23 23 - 29 mmol/L Final     Glucose   Date Value Ref Range Status   05/27/2020 91 70 - 110 mg/dL Final     BUN, Bld   Date Value Ref Range Status   05/27/2020 17 8 - 23 mg/dL Final     Creatinine   Date Value Ref Range Status   05/27/2020 0.9 0.5 - 1.4 mg/dL Final     Calcium   Date Value Ref Range Status   05/27/2020 8.9 8.7 - 10.5 mg/dL Final     Total Protein   Date Value Ref Range Status   05/27/2020 7.1 6.0 - 8.4 g/dL Final     Albumin   Date Value Ref Range Status   05/27/2020 4.1 3.5 - 5.2 g/dL Final     Total Bilirubin   Date Value Ref Range Status   05/27/2020 0.4 0.1 - 1.0 mg/dL Final     Comment:     For infants and newborns, interpretation of results should be based  on  gestational age, weight and in agreement with clinical  observations.  Premature Infant recommended reference ranges:  Up to 24 hours.............<8.0 mg/dL  Up to 48 hours............<12.0 mg/dL  3-5 days..................<15.0 mg/dL  6-29 days.................<15.0 mg/dL       Alkaline Phosphatase   Date Value Ref Range Status   05/27/2020 63 55 - 135 U/L Final     AST   Date Value Ref Range Status   05/27/2020 23 10 - 40 U/L Final     ALT   Date Value Ref Range Status   05/27/2020 21 10 - 44 U/L Final     Anion Gap   Date Value Ref Range Status   05/27/2020 9 8 - 16 mmol/L Final     eGFR if    Date Value Ref Range Status   05/27/2020 >60.0 >60 mL/min/1.73 m^2 Final     eGFR if non    Date Value Ref Range Status   05/27/2020 >60.0 >60 mL/min/1.73 m^2 Final     Comment:     Calculation used to obtain the estimated glomerular filtration  rate (eGFR) is the CKD-EPI equation.          Factor V Leiden mutation  -     rivaroxaban (XARELTO) 20 mg Tab; Take 1 tablet (20 mg total) by mouth once daily.  Dispense: 90 tablet; Refill: 1    Anticoagulated  -     rivaroxaban (XARELTO) 20 mg Tab; Take 1 tablet (20 mg total) by mouth once daily.  Dispense: 90 tablet; Refill: 1    Superficial thrombosis of left lower extremity  -     rivaroxaban (XARELTO) 20 mg Tab; Take 1 tablet (20 mg total) by mouth once daily.  Dispense: 90 tablet; Refill: 1    Osteopenia, unspecified location    History of DVT (deep vein thrombosis)  -     rivaroxaban (XARELTO) 20 mg Tab; Take 1 tablet (20 mg total) by mouth once daily.  Dispense: 90 tablet; Refill: 1    History of pulmonary embolism  -     rivaroxaban (XARELTO) 20 mg Tab; Take 1 tablet (20 mg total) by mouth once daily.  Dispense: 90 tablet; Refill: 1      Continue Xarelto lifelong as she does have a factor 5 Leiden mutation which makes her at high risk for recurrent thrombotic event  Explained the importance of her having a peripheral arterial ultrasound to  check for peripheral arterial disease given the new onset of discoloration of her left leg which is waxing and waning no need to repeat ultrasound of venous extremities.  She is to continue to wear her compression stockings which will help circulation.  For osteopenia she should continue calcium and vitamin D. she is tolerating statin therapy for dyslipidemia will follow up with her primary care in the near future discuss results over arterial ultrasound    I suggest she return to clinic in 6 months and check her anti Xa level CBC and CMP and monitor her tolerance on Xarelto  Thank you for allowing me to evaluate and participate in the care of this pleasant patient. Please fell free to call me with any questions or concerns.    ArseniolyConcepcion MD    This note was dictated with Dragon and briefly proofread. Please excuse any sentences that may be unclear or words misspelled           This service was not originating from a related E/M service provided within the previous 7 days nor will  to an E/M service or procedure within the next 24 hours or my soonest available appointment.  Prevailing standard of care was able to be met in this audio-only visit.

## 2020-06-04 ENCOUNTER — TELEPHONE (OUTPATIENT)
Dept: HEMATOLOGY/ONCOLOGY | Facility: CLINIC | Age: 75
End: 2020-06-04

## 2020-06-04 NOTE — TELEPHONE ENCOUNTER
----- Message from Concepcion Starr MD sent at 6/3/2020 11:05 AM CDT -----  Return to clinic in 6 months in [place] with CBC CMP and anti Xa level please

## 2020-06-15 ENCOUNTER — TELEPHONE (OUTPATIENT)
Dept: HEMATOLOGY/ONCOLOGY | Facility: CLINIC | Age: 75
End: 2020-06-15

## 2020-06-15 DIAGNOSIS — Z79.01 ANTICOAGULATED: ICD-10-CM

## 2020-06-15 DIAGNOSIS — D68.51 FACTOR V LEIDEN MUTATION: Primary | ICD-10-CM

## 2020-06-15 NOTE — TELEPHONE ENCOUNTER
Patient called and[facility] new rx for Xarelto sent to [facility]. She only has 2 pills left.  Sent message to SANGEETHA RESTREPO

## 2020-10-13 DIAGNOSIS — D68.51 FACTOR V LEIDEN MUTATION: ICD-10-CM

## 2020-10-13 DIAGNOSIS — Z86.718 HISTORY OF DVT (DEEP VEIN THROMBOSIS): ICD-10-CM

## 2020-10-13 DIAGNOSIS — Z79.01 ANTICOAGULATED: ICD-10-CM

## 2020-10-13 DIAGNOSIS — I82.812 SUPERFICIAL THROMBOSIS OF LEFT LOWER EXTREMITY: ICD-10-CM

## 2020-10-13 DIAGNOSIS — Z86.711 HISTORY OF PULMONARY EMBOLISM: ICD-10-CM

## 2020-10-15 ENCOUNTER — TELEPHONE (OUTPATIENT)
Dept: SURGERY | Facility: CLINIC | Age: 75
End: 2020-10-15

## 2020-10-15 NOTE — TELEPHONE ENCOUNTER
Returned patients call. Asked to speak to Ms Monte. Was told No and hung up on.  Medication pended to Dr. Starr on 10/13/20.    ----- Message from Carole Hutchins sent at 10/15/2020  2:39 PM CDT -----  Regarding: refill  Contact: patient  Type:  RX Refill Request    Who Called:  patient  Refill or New Rx:  refill  RX Name and Strength:  rivaroxaban (XARELTO) 20 mg Tab  How is the patient currently taking it? (ex. 1XDay):  1xday  Is this a 30 day or 90 day RX:  30  Preferred Pharmacy with phone number:    [id number] - [place], [place] [address] [address]  [address] [address]  Phone: [phone] Fax: [phone]  Local or Mail Order:  local  Ordering Provider:  Dr Starr  Best Call Back Number:  [phone]  Additional Information:  Patient states she called pharmacy 3 days ago. Patient states she is out of medication. Please call patient if any questions. Thanks!

## 2020-10-16 ENCOUNTER — TELEPHONE (OUTPATIENT)
Dept: HEMATOLOGY/ONCOLOGY | Facility: CLINIC | Age: 75
End: 2020-10-16

## 2020-10-16 NOTE — TELEPHONE ENCOUNTER
[facility] sent in fax for refill request on Xarelto--checked patient chart & she is showing 1 refill that should get her to 12/2020--patient also has appt in December to see provider--faxed information back to pharmacy--no further action needed at this time

## 2020-10-16 NOTE — TELEPHONE ENCOUNTER
----- Message from Jac Camarena sent at 10/16/2020 10:34 AM CDT -----  Regarding: refill  Contact: patient  Type:  RX Refill Request    Who Called:  patient  Refill or New Rx:  new  RX Name and Strength:  rivaroxaban (XARELTO) 20 mg Tab  How is the patient currently taking it? (ex. 1XDay):  Take 1 tablet (20 mg total) by mouth once daily. - Oral  Is this a 30 day or 90 day RX:  90 tablet 1 6/3/2020   Preferred Pharmacy with phone number:    [id number] - [place] [address] [address]  [address] [address]  Phone: [phone] Fax: [phone]    Ordering Provider:  Matty Groves Call Back Number:  [phone]  Additional Information:  Patient stated to please refill for 30 days with 3 refills as she cannot afford the 90 day supply.

## 2020-10-17 RX ORDER — RIVAROXABAN 20 MG/1
20 TABLET, FILM COATED ORAL DAILY
Qty: 90 TABLET | Refills: 0 | Status: SHIPPED | OUTPATIENT
Start: 2020-10-17 | End: 2020-12-23 | Stop reason: SDUPTHER

## 2020-12-11 ENCOUNTER — LAB VISIT (OUTPATIENT)
Dept: LAB | Facility: HOSPITAL | Age: 75
End: 2020-12-11
Attending: INTERNAL MEDICINE
Payer: MEDICARE

## 2020-12-11 DIAGNOSIS — D68.51 FACTOR V LEIDEN MUTATION: ICD-10-CM

## 2020-12-11 DIAGNOSIS — Z79.01 ANTICOAGULATED: ICD-10-CM

## 2020-12-11 LAB
ALBUMIN SERPL BCP-MCNC: 4.2 G/DL (ref 3.5–5.2)
ALP SERPL-CCNC: 67 U/L (ref 55–135)
ALT SERPL W/O P-5'-P-CCNC: 20 U/L (ref 10–44)
ANION GAP SERPL CALC-SCNC: 9 MMOL/L (ref 8–16)
AST SERPL-CCNC: 21 U/L (ref 10–40)
BASOPHILS # BLD AUTO: 0.03 K/UL (ref 0–0.2)
BASOPHILS NFR BLD: 0.4 % (ref 0–1.9)
BILIRUB SERPL-MCNC: 0.9 MG/DL (ref 0.1–1)
BUN SERPL-MCNC: 19 MG/DL (ref 8–23)
CALCIUM SERPL-MCNC: 9.1 MG/DL (ref 8.7–10.5)
CHLORIDE SERPL-SCNC: 105 MMOL/L (ref 95–110)
CO2 SERPL-SCNC: 26 MMOL/L (ref 23–29)
CREAT SERPL-MCNC: 1 MG/DL (ref 0.5–1.4)
DIFFERENTIAL METHOD: NORMAL
EOSINOPHIL # BLD AUTO: 0.2 K/UL (ref 0–0.5)
EOSINOPHIL NFR BLD: 2.6 % (ref 0–8)
ERYTHROCYTE [DISTWIDTH] IN BLOOD BY AUTOMATED COUNT: 13.5 % (ref 11.5–14.5)
EST. GFR  (AFRICAN AMERICAN): >60 ML/MIN/1.73 M^2
EST. GFR  (NON AFRICAN AMERICAN): 56 ML/MIN/1.73 M^2
FACT X PPP CHRO-ACNC: 1.17 IU/ML (ref 0.3–0.7)
GLUCOSE SERPL-MCNC: 88 MG/DL (ref 70–110)
HCT VFR BLD AUTO: 42.3 % (ref 37–48.5)
HGB BLD-MCNC: 14.1 G/DL (ref 12–16)
IMM GRANULOCYTES # BLD AUTO: 0.01 K/UL (ref 0–0.04)
IMM GRANULOCYTES NFR BLD AUTO: 0.1 % (ref 0–0.5)
LYMPHOCYTES # BLD AUTO: 2.8 K/UL (ref 1–4.8)
LYMPHOCYTES NFR BLD: 40.6 % (ref 18–48)
MCH RBC QN AUTO: 30.3 PG (ref 27–31)
MCHC RBC AUTO-ENTMCNC: 33.3 G/DL (ref 32–36)
MCV RBC AUTO: 91 FL (ref 82–98)
MONOCYTES # BLD AUTO: 0.5 K/UL (ref 0.3–1)
MONOCYTES NFR BLD: 7.9 % (ref 4–15)
NEUTROPHILS # BLD AUTO: 3.3 K/UL (ref 1.8–7.7)
NEUTROPHILS NFR BLD: 48.4 % (ref 38–73)
NRBC BLD-RTO: 0 /100 WBC
PLATELET # BLD AUTO: 226 K/UL (ref 150–350)
PMV BLD AUTO: 9.9 FL (ref 9.2–12.9)
POTASSIUM SERPL-SCNC: 3.9 MMOL/L (ref 3.5–5.1)
PROT SERPL-MCNC: 7.3 G/DL (ref 6–8.4)
RBC # BLD AUTO: 4.66 M/UL (ref 4–5.4)
SODIUM SERPL-SCNC: 140 MMOL/L (ref 136–145)
WBC # BLD AUTO: 6.84 K/UL (ref 3.9–12.7)

## 2020-12-11 PROCEDURE — 85025 COMPLETE CBC W/AUTO DIFF WBC: CPT

## 2020-12-11 PROCEDURE — 85520 HEPARIN ASSAY: CPT

## 2020-12-11 PROCEDURE — 36415 COLL VENOUS BLD VENIPUNCTURE: CPT

## 2020-12-11 PROCEDURE — 80053 COMPREHEN METABOLIC PANEL: CPT

## 2020-12-23 ENCOUNTER — OFFICE VISIT (OUTPATIENT)
Dept: HEMATOLOGY/ONCOLOGY | Facility: CLINIC | Age: 75
End: 2020-12-23
Payer: MEDICARE

## 2020-12-23 VITALS
WEIGHT: 166.69 LBS | DIASTOLIC BLOOD PRESSURE: 79 MMHG | BODY MASS INDEX: 25.26 KG/M2 | OXYGEN SATURATION: 95 % | HEART RATE: 91 BPM | TEMPERATURE: 98 F | HEIGHT: 68 IN | RESPIRATION RATE: 16 BRPM | SYSTOLIC BLOOD PRESSURE: 110 MMHG

## 2020-12-23 DIAGNOSIS — Z86.718 HISTORY OF DVT (DEEP VEIN THROMBOSIS): ICD-10-CM

## 2020-12-23 DIAGNOSIS — Z79.01 ANTICOAGULATED: ICD-10-CM

## 2020-12-23 DIAGNOSIS — D68.51 FACTOR V LEIDEN MUTATION: ICD-10-CM

## 2020-12-23 DIAGNOSIS — I82.812 SUPERFICIAL THROMBOSIS OF LEFT LOWER EXTREMITY: ICD-10-CM

## 2020-12-23 DIAGNOSIS — Z86.711 HISTORY OF PULMONARY EMBOLISM: ICD-10-CM

## 2020-12-23 PROCEDURE — 99213 OFFICE O/P EST LOW 20 MIN: CPT | Mod: PBBFAC | Performed by: INTERNAL MEDICINE

## 2020-12-23 PROCEDURE — 99999 PR PBB SHADOW E&M-EST. PATIENT-LVL III: CPT | Mod: PBBFAC,,, | Performed by: INTERNAL MEDICINE

## 2020-12-23 PROCEDURE — 99999 PR PBB SHADOW E&M-EST. PATIENT-LVL III: ICD-10-PCS | Mod: PBBFAC,,, | Performed by: INTERNAL MEDICINE

## 2020-12-23 PROCEDURE — 99214 PR OFFICE/OUTPT VISIT, EST, LEVL IV, 30-39 MIN: ICD-10-PCS | Mod: S$PBB,,, | Performed by: INTERNAL MEDICINE

## 2020-12-23 PROCEDURE — 99214 OFFICE O/P EST MOD 30 MIN: CPT | Mod: S$PBB,,, | Performed by: INTERNAL MEDICINE

## 2020-12-23 RX ORDER — RIVAROXABAN 20 MG/1
20 TABLET, FILM COATED ORAL DAILY
Qty: 90 TABLET | Refills: 6 | Status: SHIPPED | OUTPATIENT
Start: 2020-12-23 | End: 2022-01-11 | Stop reason: SDUPTHER

## 2020-12-23 NOTE — PROGRESS NOTES
CC I could not take the statin    Kellie Monte is a 75 y.o.  This is a 75 year old female with a history of DVT in leg and PE in her early twenties. SHe has been found to have factor leiden mutation. She ahs been on Xarelto without complications.  In May she underwent a colonoscopy and her Xarelto was held for a few days.  After this procedure patient noticed increased swelling in her left leg and was sent for an ultrasound which revealed chronic nonocclusive thrombus involving the left superficial femoral vein.  She states over the past couple of days she has this waxing and waning of redness involving her left leg.  The swelling has dissipated since resuming her Xarelto post procedure.  She developed severe pain in her legs after taking a statin . She stopped this medicine and has no pain      Miscellaneous: None      Impression       1. No ultrasound evidence to suggest a deep venous thrombosis of the right lower extremity.  2. 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  This report was flagged in Epic as abnormal.      Electronically signed by: Lb Gonsalez  Date: 05/13/2020  Time: 16:15         Past Medical History:   Diagnosis Date    Blood clot in vein     Deep vein thrombosis     while pregnant in 1972, subsequent to that while on ocp, then 2 years ago. Recommended to have lifelong anticoag at [facility].    DVT (deep venous thrombosis) 1972    While pregnant.    Factor 5 Leiden mutation, heterozygous     Gastritis 03/20/2020    Hematochezia 01/2020    History of DVT (deep vein thrombosis) 8/20/2019    Hypercholesterolemia     Internal hemorrhoids     OP (osteoporosis)     Osteoarthritis     Pulmonary embolism     Sigmoid polyp 03/20/2020     Past Surgical History:   Procedure Laterality Date    APPENDECTOMY      CHOLECYSTECTOMY      COLONOSCOPY  8/07     Jt    COLONOSCOPY N/A 3/20/2020    Procedure: COLONOSCOPY;  Surgeon: Omar Ramirez MD;   Location: [facility] ENDO;  Service: General;  Laterality: N/A;    cyst removed ENT (throat)      ESOPHAGOGASTRODUODENOSCOPY N/A 3/20/2020    Procedure: ESOPHAGOGASTRODUODENOSCOPY (EGD);  Surgeon: Omar Ramirez MD;  Location: [facility] ENDO;  Service: General;  Laterality: N/A;    INNER EAR SURGERY Left     [place] Implant    SKIN CANCER EXCISION      tonsils      TUBAL LIGATION         Current Outpatient Medications:     diclofenac sodium (VOLTAREN) 1 % Gel, Apply 2 g topically daily as needed. , Disp: , Rfl:     latanoprost 0.005 % ophthalmic solution, Place 1 drop into both eyes every evening., Disp: , Rfl:     multivit-mineral-iron-lutein (CENTRUM SILVER ULTRA WOMEN'S) Tab, Take 1 tablet by mouth every morning., Disp: , Rfl:     pantoprazole (PROTONIX) 40 MG tablet, Take 1 tablet (40 mg total) by mouth once daily., Disp: 90 tablet, Rfl: 3    pitavastatin calcium (LIVALO) 2 mg Tab tablet, Take 1 tablet (2 mg total) by mouth every evening., Disp: 90 tablet, Rfl: 3    rivaroxaban (XARELTO) 20 mg Tab, Take 1 tablet (20 mg total) by mouth once daily., Disp: 90 tablet, Rfl: 0    tramadol (ULTRAM) 50 mg tablet, Take 1 tablet (50 mg total) by mouth every 4 (four) hours as needed for Pain., Disp: 40 tablet, Rfl: 3  Review of patient's allergies indicates:  No Known Allergies  Social History     Tobacco Use    Smoking status: Former Smoker     Packs/day: 2.00     Years: 12.00     Pack years: 24.00     Types: Cigarettes     Quit date: 1982     Years since quittin.9    Smokeless tobacco: Never Used   Substance Use Topics    Alcohol use: Yes     Alcohol/week: 1.0 standard drinks     Types: 1 Glasses of wine per week     Comment: socially    Drug use: No     Family History   Problem Relation Age of Onset    Cancer Father 75        bone    Clotting disorder Father     Cancer Mother 80        colon    Colon cancer Mother     Angina Mother     Clotting disorder Mother     Clotting disorder Brother      "Heart disease Maternal Grandmother     Heart disease Paternal Grandmother        CONSTITUTIONAL: No fevers, chills, night sweats, wt. loss, appetite changes  SKIN:  Change in coloring to her left leg  ENT: No headaches, head trauma, vision changes, or eye pain  LYMPH NODES: None noticed   CV: No chest pain, palpitations.   RESP: No dyspnea on exertion, cough, wheezing, or hemoptysis  GI: No nausea, emesis, diarrhea, constipation, melena, hematochezia, pain.   : No dysuria, hematuria, urgency, or frequency   HEME: No easy bruising, bleeding problems  PSYCHIATRIC: No depression, anxiety, psychosis, hallucinations.  NEURO: No seizures, memory loss, dizziness or difficulty sleeping  MSK: No arthralgias           /79 (BP Location: Right arm, Patient Position: Sitting, BP Method: Medium (Automatic))   Pulse 91   Temp 97.9 °F (36.6 °C) (Temporal)   Resp 16   Ht 5' 8" (1.727 m)   Wt 75.6 kg (166 lb 11.2 oz)   SpO2 95%   BMI 25.35 kg/m²   Height / weight / VS reviewed  GENERAL.: Well-developed, well-nourished, in no acute distress  PSYCH: pleasant affect, no anxiety, no depression  HEENT: Normocephalic, lids intact, conjunctiva pink,  non-boggy turbinates,Normal auricula, nasal septum, dentition, gums and mucosa ,OP clear,no palatal pallor  NECK: Supple,trachea midline, no palpable abnormalities  LYMPHATICS: No cervical or axillary adenopathy  RESPIRATORY: CTAB, no wheezes, rubs or rhonchi  Good respiratory effort without any retractions or diaphragmatic movement  CARDIOVASCULAR: no JVD, S1 / S2 with RRR, no murmur, no rub,2+ capillary refill  ABDOMEN: NTND, normal bowel sounds, no palpable HSM or mass  EXTREMITIES: No cyanosis, no clubbing, no edema, no joint effusion  NEUROLOGICAL: alert and oriented x 3, cranial nerves grossly intact  SKIN: Warm, dry, no rash or lesions noted, no tenting, no petechiae or ecchymosis      .  Lab Results   Component Value Date    WBC 6.84 12/11/2020    RBC 4.66 12/11/2020    " HGB 14.1 12/11/2020    HCT 42.3 12/11/2020    MCV 91 12/11/2020    MCH 30.3 12/11/2020    MCHC 33.3 12/11/2020    RDW 13.5 12/11/2020     12/11/2020    MPV 9.9 12/11/2020    GRAN 3.3 12/11/2020    GRAN 48.4 12/11/2020    LYMPH 2.8 12/11/2020    LYMPH 40.6 12/11/2020    MONO 0.5 12/11/2020    MONO 7.9 12/11/2020    EOS 0.2 12/11/2020    BASO 0.03 12/11/2020    EOSINOPHIL 2.6 12/11/2020    BASOPHIL 0.4 12/11/2020         CMP  Sodium   Date Value Ref Range Status   12/11/2020 140 136 - 145 mmol/L Final     Potassium   Date Value Ref Range Status   12/11/2020 3.9 3.5 - 5.1 mmol/L Final     Chloride   Date Value Ref Range Status   12/11/2020 105 95 - 110 mmol/L Final     CO2   Date Value Ref Range Status   12/11/2020 26 23 - 29 mmol/L Final     Glucose   Date Value Ref Range Status   12/11/2020 88 70 - 110 mg/dL Final     BUN   Date Value Ref Range Status   12/11/2020 19 8 - 23 mg/dL Final     Creatinine   Date Value Ref Range Status   12/11/2020 1.0 0.5 - 1.4 mg/dL Final     Calcium   Date Value Ref Range Status   12/11/2020 9.1 8.7 - 10.5 mg/dL Final     Total Protein   Date Value Ref Range Status   12/11/2020 7.3 6.0 - 8.4 g/dL Final     Albumin   Date Value Ref Range Status   12/11/2020 4.2 3.5 - 5.2 g/dL Final     Total Bilirubin   Date Value Ref Range Status   12/11/2020 0.9 0.1 - 1.0 mg/dL Final     Comment:     For infants and newborns, interpretation of results should be based  on gestational age, weight and in agreement with clinical  observations.  Premature Infant recommended reference ranges:  Up to 24 hours.............<8.0 mg/dL  Up to 48 hours............<12.0 mg/dL  3-5 days..................<15.0 mg/dL  6-29 days.................<15.0 mg/dL       Alkaline Phosphatase   Date Value Ref Range Status   12/11/2020 67 55 - 135 U/L Final     AST   Date Value Ref Range Status   12/11/2020 21 10 - 40 U/L Final     ALT   Date Value Ref Range Status   12/11/2020 20 10 - 44 U/L Final     Anion Gap   Date  Value Ref Range Status   12/11/2020 9 8 - 16 mmol/L Final     eGFR if    Date Value Ref Range Status   12/11/2020 >60.0 >60 mL/min/1.73 m^2 Final     eGFR if non    Date Value Ref Range Status   12/11/2020 56.0 (A) >60 mL/min/1.73 m^2 Final     Comment:     Calculation used to obtain the estimated glomerular filtration  rate (eGFR) is the CKD-EPI equation.          Factor V Leiden mutation  -     rivaroxaban (XARELTO) 20 mg Tab; Take 1 tablet (20 mg total) by mouth once daily.  Dispense: 90 tablet; Refill: 6    Anticoagulated  -     rivaroxaban (XARELTO) 20 mg Tab; Take 1 tablet (20 mg total) by mouth once daily.  Dispense: 90 tablet; Refill: 6    Superficial thrombosis of left lower extremity  -     rivaroxaban (XARELTO) 20 mg Tab; Take 1 tablet (20 mg total) by mouth once daily.  Dispense: 90 tablet; Refill: 6    History of DVT (deep vein thrombosis)  -     rivaroxaban (XARELTO) 20 mg Tab; Take 1 tablet (20 mg total) by mouth once daily.  Dispense: 90 tablet; Refill: 6    History of pulmonary embolism  -     rivaroxaban (XARELTO) 20 mg Tab; Take 1 tablet (20 mg total) by mouth once daily.  Dispense: 90 tablet; Refill: 6      Continue Xarelto lifelong as she does have a factor 5 Leiden mutation which makes her at high risk for recurrent thrombotic event  She is to continue to wear her compression stockings which will help circulation.  For osteopenia she should continue calcium and vitamin D.   I am going to discharge from [facility] and refer back to pcp   She does not need any further imaging studies       I suggest she return to clinic in 6 months and check her anti Xa level CBC and CMP and monitor her tolerance on Xarelto  Thank you for allowing me to evaluate and participate in the care of this pleasant patient. Please fell free to call me with any questions or concerns.    Concepcion Musa MD    This note was dictated with Dragon and briefly proofread. Please excuse any  sentences that may be unclear or words misspelled           This service was not originating from a related E/M service provided within the previous 7 days nor will  to an E/M service or procedure within the next 24 hours or my soonest available appointment.  Prevailing standard of care was able to be met in this audio-only visit.

## 2021-03-29 ENCOUNTER — OFFICE VISIT (OUTPATIENT)
Dept: FAMILY MEDICINE | Facility: CLINIC | Age: 76
End: 2021-03-29
Payer: MEDICARE

## 2021-03-29 VITALS
SYSTOLIC BLOOD PRESSURE: 138 MMHG | DIASTOLIC BLOOD PRESSURE: 84 MMHG | RESPIRATION RATE: 12 BRPM | HEIGHT: 68 IN | HEART RATE: 76 BPM | WEIGHT: 164.63 LBS | TEMPERATURE: 98 F | OXYGEN SATURATION: 97 % | BODY MASS INDEX: 24.95 KG/M2

## 2021-03-29 DIAGNOSIS — R73.09 HEMOGLOBIN A1C LESS THAN 7.0%: ICD-10-CM

## 2021-03-29 DIAGNOSIS — Z79.899 OTHER LONG TERM (CURRENT) DRUG THERAPY: ICD-10-CM

## 2021-03-29 DIAGNOSIS — D68.51 FACTOR 5 LEIDEN MUTATION, HETEROZYGOUS: ICD-10-CM

## 2021-03-29 DIAGNOSIS — E78.00 HYPERCHOLESTEREMIA: ICD-10-CM

## 2021-03-29 DIAGNOSIS — Z76.89 ENCOUNTER TO ESTABLISH CARE: Primary | ICD-10-CM

## 2021-03-29 PROCEDURE — 99203 OFFICE O/P NEW LOW 30 MIN: CPT | Mod: S$GLB,,, | Performed by: FAMILY MEDICINE

## 2021-03-29 PROCEDURE — 99203 PR OFFICE/OUTPT VISIT, NEW, LEVL III, 30-44 MIN: ICD-10-PCS | Mod: S$GLB,,, | Performed by: FAMILY MEDICINE

## 2021-06-21 ENCOUNTER — LAB VISIT (OUTPATIENT)
Dept: LAB | Facility: HOSPITAL | Age: 76
End: 2021-06-21
Attending: FAMILY MEDICINE
Payer: MEDICARE

## 2021-06-21 DIAGNOSIS — Z79.899 OTHER LONG TERM (CURRENT) DRUG THERAPY: ICD-10-CM

## 2021-06-21 DIAGNOSIS — E78.00 HYPERCHOLESTEREMIA: ICD-10-CM

## 2021-06-21 DIAGNOSIS — R73.09 HEMOGLOBIN A1C LESS THAN 7.0%: ICD-10-CM

## 2021-06-21 DIAGNOSIS — D68.51 FACTOR 5 LEIDEN MUTATION, HETEROZYGOUS: ICD-10-CM

## 2021-06-21 LAB
ALBUMIN SERPL BCP-MCNC: 3.8 G/DL (ref 3.5–5.2)
ALP SERPL-CCNC: 63 U/L (ref 55–135)
ALT SERPL W/O P-5'-P-CCNC: 12 U/L (ref 10–44)
ANION GAP SERPL CALC-SCNC: 10 MMOL/L (ref 8–16)
AST SERPL-CCNC: 15 U/L (ref 10–40)
BASOPHILS # BLD AUTO: 0.04 K/UL (ref 0–0.2)
BASOPHILS NFR BLD: 0.8 % (ref 0–1.9)
BILIRUB SERPL-MCNC: 0.6 MG/DL (ref 0.1–1)
BUN SERPL-MCNC: 13 MG/DL (ref 8–23)
CALCIUM SERPL-MCNC: 9.4 MG/DL (ref 8.7–10.5)
CHLORIDE SERPL-SCNC: 108 MMOL/L (ref 95–110)
CHOLEST SERPL-MCNC: 234 MG/DL (ref 120–199)
CHOLEST/HDLC SERPL: 3.5 {RATIO} (ref 2–5)
CO2 SERPL-SCNC: 23 MMOL/L (ref 23–29)
CREAT SERPL-MCNC: 0.9 MG/DL (ref 0.5–1.4)
DIFFERENTIAL METHOD: NORMAL
EOSINOPHIL # BLD AUTO: 0.2 K/UL (ref 0–0.5)
EOSINOPHIL NFR BLD: 3.6 % (ref 0–8)
ERYTHROCYTE [DISTWIDTH] IN BLOOD BY AUTOMATED COUNT: 13.8 % (ref 11.5–14.5)
EST. GFR  (AFRICAN AMERICAN): >60 ML/MIN/1.73 M^2
EST. GFR  (NON AFRICAN AMERICAN): >60 ML/MIN/1.73 M^2
ESTIMATED AVG GLUCOSE: 108 MG/DL (ref 68–131)
FACT X PPP CHRO-ACNC: 0.6 IU/ML (ref 0.3–0.7)
GLUCOSE SERPL-MCNC: 90 MG/DL (ref 70–110)
HBA1C MFR BLD: 5.4 % (ref 4–5.6)
HCT VFR BLD AUTO: 42 % (ref 37–48.5)
HDLC SERPL-MCNC: 67 MG/DL (ref 40–75)
HDLC SERPL: 28.6 % (ref 20–50)
HGB BLD-MCNC: 13.9 G/DL (ref 12–16)
IMM GRANULOCYTES # BLD AUTO: 0.01 K/UL (ref 0–0.04)
IMM GRANULOCYTES NFR BLD AUTO: 0.2 % (ref 0–0.5)
LDLC SERPL CALC-MCNC: 150.8 MG/DL (ref 63–159)
LYMPHOCYTES # BLD AUTO: 2.1 K/UL (ref 1–4.8)
LYMPHOCYTES NFR BLD: 41.6 % (ref 18–48)
MCH RBC QN AUTO: 30.2 PG (ref 27–31)
MCHC RBC AUTO-ENTMCNC: 33.1 G/DL (ref 32–36)
MCV RBC AUTO: 91 FL (ref 82–98)
MONOCYTES # BLD AUTO: 0.4 K/UL (ref 0.3–1)
MONOCYTES NFR BLD: 8.3 % (ref 4–15)
NEUTROPHILS # BLD AUTO: 2.3 K/UL (ref 1.8–7.7)
NEUTROPHILS NFR BLD: 45.5 % (ref 38–73)
NONHDLC SERPL-MCNC: 167 MG/DL
NRBC BLD-RTO: 0 /100 WBC
PLATELET # BLD AUTO: 192 K/UL (ref 150–450)
PMV BLD AUTO: 10.4 FL (ref 9.2–12.9)
POTASSIUM SERPL-SCNC: 4.4 MMOL/L (ref 3.5–5.1)
PROT SERPL-MCNC: 6.8 G/DL (ref 6–8.4)
RBC # BLD AUTO: 4.6 M/UL (ref 4–5.4)
SODIUM SERPL-SCNC: 141 MMOL/L (ref 136–145)
TRIGL SERPL-MCNC: 81 MG/DL (ref 30–150)
TSH SERPL DL<=0.005 MIU/L-ACNC: 1.49 UIU/ML (ref 0.4–4)
WBC # BLD AUTO: 5.03 K/UL (ref 3.9–12.7)

## 2021-06-21 PROCEDURE — 80061 LIPID PANEL: CPT | Performed by: FAMILY MEDICINE

## 2021-06-21 PROCEDURE — 84443 ASSAY THYROID STIM HORMONE: CPT | Performed by: FAMILY MEDICINE

## 2021-06-21 PROCEDURE — 36415 COLL VENOUS BLD VENIPUNCTURE: CPT | Performed by: FAMILY MEDICINE

## 2021-06-21 PROCEDURE — 85025 COMPLETE CBC W/AUTO DIFF WBC: CPT | Performed by: FAMILY MEDICINE

## 2021-06-21 PROCEDURE — 85520 HEPARIN ASSAY: CPT | Performed by: FAMILY MEDICINE

## 2021-06-21 PROCEDURE — 83036 HEMOGLOBIN GLYCOSYLATED A1C: CPT | Performed by: FAMILY MEDICINE

## 2021-06-21 PROCEDURE — 80053 COMPREHEN METABOLIC PANEL: CPT | Performed by: FAMILY MEDICINE

## 2021-06-24 ENCOUNTER — OFFICE VISIT (OUTPATIENT)
Dept: FAMILY MEDICINE | Facility: CLINIC | Age: 76
End: 2021-06-24
Payer: MEDICARE

## 2021-06-24 VITALS
HEIGHT: 68 IN | OXYGEN SATURATION: 97 % | TEMPERATURE: 98 F | RESPIRATION RATE: 12 BRPM | SYSTOLIC BLOOD PRESSURE: 120 MMHG | WEIGHT: 157 LBS | BODY MASS INDEX: 23.79 KG/M2 | HEART RATE: 81 BPM | DIASTOLIC BLOOD PRESSURE: 68 MMHG

## 2021-06-24 DIAGNOSIS — E78.00 HYPERCHOLESTEREMIA: Primary | ICD-10-CM

## 2021-06-24 PROCEDURE — 99214 PR OFFICE/OUTPT VISIT, EST, LEVL IV, 30-39 MIN: ICD-10-PCS | Mod: S$GLB,,, | Performed by: FAMILY MEDICINE

## 2021-06-24 PROCEDURE — 99214 OFFICE O/P EST MOD 30 MIN: CPT | Mod: S$GLB,,, | Performed by: FAMILY MEDICINE

## 2021-06-24 RX ORDER — ROSUVASTATIN CALCIUM 5 MG/1
5 TABLET, COATED ORAL NIGHTLY
Qty: 90 TABLET | Refills: 2 | Status: SHIPPED | OUTPATIENT
Start: 2021-06-24 | End: 2022-03-25 | Stop reason: SDUPTHER

## 2021-09-16 ENCOUNTER — LAB VISIT (OUTPATIENT)
Dept: LAB | Facility: HOSPITAL | Age: 76
End: 2021-09-16
Attending: FAMILY MEDICINE
Payer: MEDICARE

## 2021-09-16 DIAGNOSIS — E78.00 HYPERCHOLESTEREMIA: ICD-10-CM

## 2021-09-16 LAB
CHOLEST SERPL-MCNC: 173 MG/DL (ref 120–199)
CHOLEST/HDLC SERPL: 2.7 {RATIO} (ref 2–5)
HDLC SERPL-MCNC: 65 MG/DL (ref 40–75)
HDLC SERPL: 37.6 % (ref 20–50)
LDLC SERPL CALC-MCNC: 90.4 MG/DL (ref 63–159)
NONHDLC SERPL-MCNC: 108 MG/DL
TRIGL SERPL-MCNC: 88 MG/DL (ref 30–150)

## 2021-09-16 PROCEDURE — 80061 LIPID PANEL: CPT | Performed by: FAMILY MEDICINE

## 2021-09-16 PROCEDURE — 36415 COLL VENOUS BLD VENIPUNCTURE: CPT | Performed by: FAMILY MEDICINE

## 2021-09-24 ENCOUNTER — OFFICE VISIT (OUTPATIENT)
Dept: FAMILY MEDICINE | Facility: CLINIC | Age: 76
End: 2021-09-24
Payer: MEDICARE

## 2021-09-24 ENCOUNTER — HOSPITAL ENCOUNTER (OUTPATIENT)
Dept: RADIOLOGY | Facility: HOSPITAL | Age: 76
Discharge: HOME | End: 2021-09-24
Attending: FAMILY MEDICINE
Payer: MEDICARE

## 2021-09-24 VITALS
OXYGEN SATURATION: 98 % | SYSTOLIC BLOOD PRESSURE: 122 MMHG | HEART RATE: 78 BPM | HEIGHT: 68 IN | RESPIRATION RATE: 13 BRPM | WEIGHT: 150.19 LBS | BODY MASS INDEX: 22.76 KG/M2 | DIASTOLIC BLOOD PRESSURE: 72 MMHG | TEMPERATURE: 98 F

## 2021-09-24 DIAGNOSIS — E78.00 HYPERCHOLESTEREMIA: ICD-10-CM

## 2021-09-24 DIAGNOSIS — Z12.31 ENCOUNTER FOR SCREENING MAMMOGRAM FOR MALIGNANT NEOPLASM OF BREAST: Primary | ICD-10-CM

## 2021-09-24 DIAGNOSIS — Z23 NEED FOR INFLUENZA VACCINATION: ICD-10-CM

## 2021-09-24 DIAGNOSIS — Z12.31 ENCOUNTER FOR SCREENING MAMMOGRAM FOR MALIGNANT NEOPLASM OF BREAST: ICD-10-CM

## 2021-09-24 PROCEDURE — 99999 PR PBB SHADOW E&M-EST. PATIENT-LVL IV: CPT | Mod: PBBFAC,,, | Performed by: FAMILY MEDICINE

## 2021-09-24 PROCEDURE — 77067 SCR MAMMO BI INCL CAD: CPT | Mod: TC

## 2021-09-24 PROCEDURE — 99213 PR OFFICE/OUTPT VISIT, EST, LEVL III, 20-29 MIN: ICD-10-PCS | Mod: ,,, | Performed by: FAMILY MEDICINE

## 2021-09-24 PROCEDURE — 77063 MAMMO DIGITAL SCREENING BILAT WITH TOMO: ICD-10-PCS | Mod: 26,,, | Performed by: RADIOLOGY

## 2021-09-24 PROCEDURE — 99999 PR PBB SHADOW E&M-EST. PATIENT-LVL IV: ICD-10-PCS | Mod: PBBFAC,,, | Performed by: FAMILY MEDICINE

## 2021-09-24 PROCEDURE — 77067 MAMMO DIGITAL SCREENING BILAT WITH TOMO: ICD-10-PCS | Mod: 26,,, | Performed by: RADIOLOGY

## 2021-09-24 PROCEDURE — 90662 IIV NO PRSV INCREASED AG IM: CPT | Mod: PBBFAC

## 2021-09-24 PROCEDURE — 77067 SCR MAMMO BI INCL CAD: CPT | Mod: 26,,, | Performed by: RADIOLOGY

## 2021-09-24 PROCEDURE — 99214 OFFICE O/P EST MOD 30 MIN: CPT | Mod: PBBFAC | Performed by: FAMILY MEDICINE

## 2021-09-24 PROCEDURE — 77063 BREAST TOMOSYNTHESIS BI: CPT | Mod: 26,,, | Performed by: RADIOLOGY

## 2021-09-24 PROCEDURE — G0008 ADMIN INFLUENZA VIRUS VAC: HCPCS | Mod: PBBFAC

## 2021-09-24 PROCEDURE — 99213 OFFICE O/P EST LOW 20 MIN: CPT | Mod: ,,, | Performed by: FAMILY MEDICINE

## 2022-01-11 DIAGNOSIS — Z86.711 HISTORY OF PULMONARY EMBOLISM: ICD-10-CM

## 2022-01-11 DIAGNOSIS — Z79.01 ANTICOAGULATED: ICD-10-CM

## 2022-01-11 DIAGNOSIS — Z86.718 HISTORY OF DVT (DEEP VEIN THROMBOSIS): ICD-10-CM

## 2022-01-11 DIAGNOSIS — D68.51 FACTOR V LEIDEN MUTATION: ICD-10-CM

## 2022-01-11 DIAGNOSIS — I82.812 SUPERFICIAL THROMBOSIS OF LEFT LOWER EXTREMITY: ICD-10-CM

## 2022-01-11 NOTE — TELEPHONE ENCOUNTER
----- Message from Jenise Thacker sent at 1/10/2022  3:14 PM CST -----  Regarding: needs refill asap  Please send refill on her Xarelto.  She has tried to reach out via [facility] but they dedicated they have not received a response.     Patient is requesting a refill on the following medication:    Drug: XARELTO 20MG TAB  SI TAB BY MOUTH ONCE DAILY  Qty: 90   Last Refill: 10/09/21  RX Number: [id number]  Pharmacy Requested: [facility], [place]

## 2022-01-11 NOTE — TELEPHONE ENCOUNTER
----- Message from Jenise Thacker sent at 1/10/2022  3:14 PM CST -----  Regarding: needs refill asap  Please send refill on her Xarelto.  She has tried to reach out via [facility] but they dedicated they have not received a response.     Patient is requesting a refill on the following medication:    Drug: XARELTO 20MG TAB  SI TAB BY MOUTH ONCE DAILY  Qty: 90   Last Refill: 10/09/21  RX Number: [id number]  Pharmacy Requested: [facility], [place]

## 2022-03-25 ENCOUNTER — OFFICE VISIT (OUTPATIENT)
Dept: FAMILY MEDICINE | Facility: CLINIC | Age: 77
End: 2022-03-25
Payer: MEDICARE

## 2022-03-25 VITALS
RESPIRATION RATE: 14 BRPM | HEIGHT: 68 IN | DIASTOLIC BLOOD PRESSURE: 80 MMHG | BODY MASS INDEX: 22.34 KG/M2 | SYSTOLIC BLOOD PRESSURE: 128 MMHG | WEIGHT: 147.38 LBS | TEMPERATURE: 97 F | OXYGEN SATURATION: 98 % | HEART RATE: 76 BPM

## 2022-03-25 DIAGNOSIS — Z23 NEED FOR PNEUMOCOCCAL VACCINATION: Primary | ICD-10-CM

## 2022-03-25 DIAGNOSIS — E78.00 HYPERCHOLESTEREMIA: ICD-10-CM

## 2022-03-25 DIAGNOSIS — D68.51 FACTOR 5 LEIDEN MUTATION, HETEROZYGOUS: ICD-10-CM

## 2022-03-25 DIAGNOSIS — R14.2 BELCHING: ICD-10-CM

## 2022-03-25 PROCEDURE — 99213 OFFICE O/P EST LOW 20 MIN: CPT | Mod: S$PBB,,, | Performed by: FAMILY MEDICINE

## 2022-03-25 PROCEDURE — 99213 PR OFFICE/OUTPT VISIT, EST, LEVL III, 20-29 MIN: ICD-10-PCS | Mod: S$PBB,,, | Performed by: FAMILY MEDICINE

## 2022-03-25 PROCEDURE — 99999 PR PBB SHADOW E&M-EST. PATIENT-LVL III: ICD-10-PCS | Mod: PBBFAC,,, | Performed by: FAMILY MEDICINE

## 2022-03-25 PROCEDURE — 99213 OFFICE O/P EST LOW 20 MIN: CPT | Mod: PBBFAC,25 | Performed by: FAMILY MEDICINE

## 2022-03-25 PROCEDURE — G0009 ADMIN PNEUMOCOCCAL VACCINE: HCPCS | Mod: PBBFAC

## 2022-03-25 PROCEDURE — 99999 PR PBB SHADOW E&M-EST. PATIENT-LVL III: CPT | Mod: PBBFAC,,, | Performed by: FAMILY MEDICINE

## 2022-03-25 RX ORDER — ROSUVASTATIN CALCIUM 5 MG/1
5 TABLET, COATED ORAL NIGHTLY
Qty: 90 TABLET | Refills: 2 | Status: SHIPPED | OUTPATIENT
Start: 2022-03-25 | End: 2022-04-22

## 2022-03-25 RX ORDER — PANTOPRAZOLE SODIUM 40 MG/1
40 TABLET, DELAYED RELEASE ORAL DAILY
Qty: 90 TABLET | Refills: 2 | Status: SHIPPED | OUTPATIENT
Start: 2022-03-25 | End: 2022-10-05 | Stop reason: SDUPTHER

## 2022-03-25 NOTE — PROGRESS NOTES
Kellie Monte arrive to clinic awake and alert.  Pt verified name and .   Allergies reviewed with patient.  Pt given Pneumococcal Vaccine per provider orders.    Pt tolerated well.  Pt denies any further needs.    Patient states no vaccines in the last 14 days.  Vaccine information sheet was given to patient. Patient voiced understanding.    Yohana Hernandez LPN

## 2022-03-25 NOTE — PROGRESS NOTES
[facility] - Clinic Note    Subjective      Ms. Monte is a 76 y.o. female who presents to clinic for Follow-up (6mth follow up, requesting refills)    Patient is doing well overall.  She has been tolerating the Crestor.  Blood work has improved.    [facility] Kellie has a past medical history of Blood clot in vein, Deep vein thrombosis, DVT (deep venous thrombosis) (1972), Factor 5 Leiden mutation, heterozygous, Gastritis (03/20/2020), Hematochezia (01/2020), History of DVT (deep vein thrombosis) (8/20/2019), Hypercholesterolemia, Internal hemorrhoids, Malignant neoplasm of skin, OP (osteoporosis), Osteoarthritis, Pulmonary embolism, and Sigmoid polyp (03/20/2020).   [facility] Kellie has a past surgical history that includes cyst removed ENT (throat); Tubal ligation; Appendectomy; tonsils; Colonoscopy (8/07); Skin cancer excision; Inner ear surgery (Left); Cholecystectomy; Colonoscopy (N/A, 3/20/2020); and Esophagogastroduodenoscopy (N/A, 3/20/2020).    Kellie's family history includes Angina in her mother; Cancer (age of onset: 75) in her father; Cancer (age of onset: 80) in her mother; Clotting disorder in her brother, father, and mother; Colon cancer in her mother; Heart disease in her maternal grandmother and paternal grandmother.    Kellie reports that she quit smoking about 40 years ago. Her smoking use included cigarettes. She has a 24.00 pack-year smoking history. She has never used smokeless tobacco. She reports current alcohol use of about 1.0 standard drink of alcohol per week. She reports that she does not use drugs.   [facility] Kellie has No Known Allergies.   JOSUÉ Hopkins has a current medication list which includes the following prescription(s): latanoprost, multivit-mineral-iron-lutein, rivaroxaban, pantoprazole, and rosuvastatin.     Review of Systems   Constitutional: Negative for chills and fever.   Respiratory: Negative for shortness of breath.    Cardiovascular: Negative for chest pain.   Musculoskeletal: Negative for  "myalgias.     Objective     /80 (BP Location: Left arm, Patient Position: Sitting, BP Method: Large (Manual))   Pulse 76   Temp 97.3 °F (36.3 °C) (Temporal)   Resp 14   Ht 5' 8" (1.727 m)   Wt 66.9 kg (147 lb 6.4 oz)   SpO2 98%   BMI 22.41 kg/m²     Physical Exam  Vitals and nursing note reviewed.   Constitutional:       General: She is not in acute distress.     Appearance: Normal appearance. She is well-developed. She is not diaphoretic.   HENT:      Head: Normocephalic and atraumatic.      Right Ear: External ear normal.      Left Ear: External ear normal.   Eyes:      General:         Right eye: No discharge.         Left eye: No discharge.   Cardiovascular:      Rate and Rhythm: Normal rate and regular rhythm.      Heart sounds: Normal heart sounds.   Pulmonary:      Effort: Pulmonary effort is normal.      Breath sounds: Normal breath sounds. No wheezing or rales.   Skin:     General: Skin is warm and dry.   Neurological:      Mental Status: She is alert and oriented to person, place, and time. Mental status is at baseline.   Psychiatric:         Mood and Affect: Mood normal.         Behavior: Behavior normal.         Thought Content: Thought content normal.         Judgment: Judgment normal.        Assessment/Plan     1. Need for pneumococcal vaccination  (In Office Administered) Pneumococcal Polysaccharide Vaccine (23 Valent) (SQ/IM)   2. Belching  pantoprazole (PROTONIX) 40 MG tablet   3. Hypercholesteremia  rosuvastatin (CRESTOR) 5 MG tablet   4. Factor 5 Leiden mutation, heterozygous       Hyperlipidemia has improved.  Continue current medications as prescribed. Pneumonia vaccine today.  Follow up in 6 months.    Future Appointments   Date Time Provider Department Center   9/26/2022 10:20 AM Cali Palomo MD [facility]         Cali Palomo MD  Family Medicine  [facility]              "

## 2022-08-22 DIAGNOSIS — E78.00 HYPERCHOLESTEREMIA: ICD-10-CM

## 2022-08-22 RX ORDER — ROSUVASTATIN CALCIUM 5 MG/1
5 TABLET, COATED ORAL NIGHTLY
Qty: 90 TABLET | Refills: 0 | Status: SHIPPED | OUTPATIENT
Start: 2022-08-22 | End: 2022-10-05 | Stop reason: SDUPTHER

## 2022-10-05 ENCOUNTER — OFFICE VISIT (OUTPATIENT)
Dept: FAMILY MEDICINE | Facility: CLINIC | Age: 77
End: 2022-10-05
Payer: MEDICARE

## 2022-10-05 VITALS
HEIGHT: 68 IN | OXYGEN SATURATION: 97 % | HEART RATE: 83 BPM | WEIGHT: 148 LBS | BODY MASS INDEX: 22.43 KG/M2 | TEMPERATURE: 98 F | DIASTOLIC BLOOD PRESSURE: 74 MMHG | SYSTOLIC BLOOD PRESSURE: 110 MMHG

## 2022-10-05 DIAGNOSIS — D68.51 FACTOR 5 LEIDEN MUTATION, HETEROZYGOUS: ICD-10-CM

## 2022-10-05 DIAGNOSIS — Z79.01 LONG TERM CURRENT USE OF ANTICOAGULANT THERAPY: ICD-10-CM

## 2022-10-05 DIAGNOSIS — Z00.00 ANNUAL PHYSICAL EXAM: Primary | ICD-10-CM

## 2022-10-05 DIAGNOSIS — Z12.31 ENCOUNTER FOR SCREENING MAMMOGRAM FOR MALIGNANT NEOPLASM OF BREAST: ICD-10-CM

## 2022-10-05 DIAGNOSIS — Z79.899 ENCOUNTER FOR LONG-TERM (CURRENT) USE OF MEDICATIONS: ICD-10-CM

## 2022-10-05 DIAGNOSIS — R14.2 BELCHING: ICD-10-CM

## 2022-10-05 DIAGNOSIS — R73.09 HEMOGLOBIN A1C LESS THAN 7.0%: ICD-10-CM

## 2022-10-05 DIAGNOSIS — E78.00 HYPERCHOLESTEREMIA: ICD-10-CM

## 2022-10-05 DIAGNOSIS — L98.499 NON-PRESSURE CHRONIC ULCER OF SKIN OF OTHER SITES WITH UNSPECIFIED SEVERITY: ICD-10-CM

## 2022-10-05 PROCEDURE — 99999 PR PBB SHADOW E&M-EST. PATIENT-LVL III: ICD-10-PCS | Mod: PBBFAC,,, | Performed by: FAMILY MEDICINE

## 2022-10-05 PROCEDURE — 99999 PR PBB SHADOW E&M-EST. PATIENT-LVL III: CPT | Mod: PBBFAC,,, | Performed by: FAMILY MEDICINE

## 2022-10-05 PROCEDURE — G0008 ADMIN INFLUENZA VIRUS VAC: HCPCS | Mod: PBBFAC

## 2022-10-05 PROCEDURE — 99213 OFFICE O/P EST LOW 20 MIN: CPT | Mod: PBBFAC,25 | Performed by: FAMILY MEDICINE

## 2022-10-05 PROCEDURE — 99397 PER PM REEVAL EST PAT 65+ YR: CPT | Mod: GZ,S$PBB,, | Performed by: FAMILY MEDICINE

## 2022-10-05 PROCEDURE — 99397 PR PREVENTIVE VISIT,EST,65 & OVER: ICD-10-PCS | Mod: GZ,S$PBB,, | Performed by: FAMILY MEDICINE

## 2022-10-05 RX ORDER — PANTOPRAZOLE SODIUM 40 MG/1
40 TABLET, DELAYED RELEASE ORAL DAILY
Qty: 90 TABLET | Refills: 2 | Status: SHIPPED | OUTPATIENT
Start: 2022-10-05 | End: 2022-11-15 | Stop reason: SDUPTHER

## 2022-10-05 RX ORDER — ROSUVASTATIN CALCIUM 5 MG/1
5 TABLET, COATED ORAL NIGHTLY
Qty: 90 TABLET | Refills: 0 | Status: SHIPPED | OUTPATIENT
Start: 2022-10-05 | End: 2022-11-15 | Stop reason: SDUPTHER

## 2022-10-05 NOTE — PROGRESS NOTES
Kellie Monte arrive to clinic awake and alert.  Pt verified name and .   Allergies reviewed with patient.  Pt given flu vaccine via intramuscular per provider orders.    Pt tolerated well and denies further needs.    Patient instructed to wait 15 mins after injection.   Patient states no vaccines in the last 14 days.  Vaccine information sheet was given to patient. Patient voiced understanding.    Elizabeth Bauman LPN

## 2022-10-05 NOTE — PROGRESS NOTES
[facility] - Clinic Note    Subjective      Ms. Monte is a 77 y.o. female who presents to clinic for Follow-up (Order for mammo and refills. Overall no complaints.)      Patient is doing well overall.  She has been tolerating the Crestor.     [facility] Kellie has a past medical history of Blood clot in vein, Deep vein thrombosis, DVT (deep venous thrombosis) (1972), Factor 5 Leiden mutation, heterozygous, Gastritis (03/20/2020), Hematochezia (01/2020), History of DVT (deep vein thrombosis) (8/20/2019), Hypercholesterolemia, Internal hemorrhoids, Malignant neoplasm of skin, OP (osteoporosis), Osteoarthritis, Pulmonary embolism, and Sigmoid polyp (03/20/2020).   [facility] Kellie has a past surgical history that includes cyst removed ENT (throat); Tubal ligation; Appendectomy; tonsils; Colonoscopy (8/07); Skin cancer excision; Inner ear surgery (Left); Cholecystectomy; Colonoscopy (N/A, 3/20/2020); and Esophagogastroduodenoscopy (N/A, 3/20/2020).    Kellie's family history includes Angina in her mother; Cancer (age of onset: 75) in her father; Cancer (age of onset: 80) in her mother; Clotting disorder in her brother, father, and mother; Colon cancer in her mother; Heart disease in her maternal grandmother and paternal grandmother.    Kellie reports that she quit smoking about 40 years ago. Her smoking use included cigarettes. She has a 24.00 pack-year smoking history. She has never used smokeless tobacco. She reports current alcohol use of about 1.0 standard drink per week. She reports that she does not use drugs.   [facility] Kellie has No Known Allergies.   JOSUÉ Hopkins has a current medication list which includes the following prescription(s): latanoprost, multivit-mineral-iron-lutein, pantoprazole, rivaroxaban, and rosuvastatin.     Review of Systems   Constitutional:  Negative for chills and fever.   HENT:  Negative for congestion and rhinorrhea.    Eyes:  Negative for visual disturbance.   Respiratory:  Negative for cough and shortness  "of breath.    Cardiovascular:  Negative for chest pain.   Gastrointestinal:  Negative for abdominal pain, constipation, diarrhea, nausea and vomiting.   Genitourinary:  Negative for dysuria.   Musculoskeletal:  Negative for myalgias.   Skin:  Negative for rash.   Neurological:  Negative for weakness and headaches.   Objective     /74   Pulse 83   Temp 97.9 °F (36.6 °C)   Ht 5' 8" (1.727 m)   Wt 67.1 kg (148 lb)   SpO2 97%   BMI 22.50 kg/m²     Physical Exam  Vitals and nursing note reviewed.   Constitutional:       General: She is not in acute distress.     Appearance: Normal appearance. She is well-developed. She is not diaphoretic.   HENT:      Head: Normocephalic and atraumatic.      Right Ear: External ear normal.      Left Ear: External ear normal.   Eyes:      General:         Right eye: No discharge.         Left eye: No discharge.   Cardiovascular:      Rate and Rhythm: Normal rate and regular rhythm.      Heart sounds: Normal heart sounds.   Pulmonary:      Effort: Pulmonary effort is normal.      Breath sounds: Normal breath sounds. No wheezing or rales.   Skin:     General: Skin is warm and dry.   Neurological:      Mental Status: She is alert and oriented to person, place, and time. Mental status is at baseline.   Psychiatric:         Mood and Affect: Mood normal.         Behavior: Behavior normal.         Thought Content: Thought content normal.         Judgment: Judgment normal.      Assessment/Plan     1. Annual physical exam        2. Belching  DISCONTINUED: pantoprazole (PROTONIX) 40 MG tablet      3. Hypercholesteremia  DISCONTINUED: rosuvastatin (CRESTOR) 5 MG tablet      4. Encounter for screening mammogram for malignant neoplasm of breast  Mammo Digital Screening Bilat w/ Yovani    CANCELED: Mammo Digital Screening Bilat      5. Long term current use of anticoagulant therapy        6. Hemoglobin A1c less than 7.0%  Hemoglobin A1C      7. Factor 5 Leiden mutation, heterozygous  Lipid " Panel    Comprehensive Metabolic Panel    CBC Auto Differential    TSH      8. Encounter for long-term (current) use of medications  Lipid Panel    TSH      9. 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  Flu vaccination today.  Mammogram scheduled.  Follow up in 6 months.    No future appointments.        Cali Palomo MD  Family Medicine  [facility]

## 2022-10-13 ENCOUNTER — HOSPITAL ENCOUNTER (OUTPATIENT)
Dept: RADIOLOGY | Facility: HOSPITAL | Age: 77
Discharge: HOME | End: 2022-10-13
Attending: FAMILY MEDICINE
Payer: MEDICARE

## 2022-10-13 VITALS — WEIGHT: 147.69 LBS | HEIGHT: 68 IN | BODY MASS INDEX: 22.38 KG/M2

## 2022-10-13 DIAGNOSIS — Z12.31 ENCOUNTER FOR SCREENING MAMMOGRAM FOR MALIGNANT NEOPLASM OF BREAST: ICD-10-CM

## 2022-10-13 PROCEDURE — 77067 SCR MAMMO BI INCL CAD: CPT | Mod: 26,,, | Performed by: RADIOLOGY

## 2022-10-13 PROCEDURE — 77063 BREAST TOMOSYNTHESIS BI: CPT | Mod: 26,,, | Performed by: RADIOLOGY

## 2022-10-13 PROCEDURE — 77067 MAMMO DIGITAL SCREENING BILAT WITH TOMO: ICD-10-PCS | Mod: 26,,, | Performed by: RADIOLOGY

## 2022-10-13 PROCEDURE — 77063 BREAST TOMOSYNTHESIS BI: CPT | Mod: TC

## 2022-10-13 PROCEDURE — 77063 MAMMO DIGITAL SCREENING BILAT WITH TOMO: ICD-10-PCS | Mod: 26,,, | Performed by: RADIOLOGY

## 2022-11-15 DIAGNOSIS — E78.00 HYPERCHOLESTEREMIA: ICD-10-CM

## 2022-11-15 DIAGNOSIS — R14.2 BELCHING: ICD-10-CM

## 2022-11-16 RX ORDER — PANTOPRAZOLE SODIUM 40 MG/1
40 TABLET, DELAYED RELEASE ORAL DAILY
Qty: 90 TABLET | Refills: 2 | Status: SHIPPED | OUTPATIENT
Start: 2022-11-16 | End: 2023-08-10

## 2022-11-16 RX ORDER — ROSUVASTATIN CALCIUM 5 MG/1
5 TABLET, COATED ORAL NIGHTLY
Qty: 90 TABLET | Refills: 0 | Status: SHIPPED | OUTPATIENT
Start: 2022-11-16 | End: 2023-03-15

## 2022-11-16 NOTE — TELEPHONE ENCOUNTER
----- Message from Rachele Glynn sent at 11/16/2022  9:56 AM CST -----  Contact: [facility]  Type:  RX Refill Request        Who Called:  [facility]  Refill or New Rx:  refill  RX Name and Strength:  rosuvastatin (CRESTOR) 5 MG tablet  pantoprazole (PROTONIX) 40 MG tablet  How is the patient currently taking it? (ex. 1XDay):  as directed  Is this a 30 day or 90 day RX:  90d  Preferred Pharmacy with phone number:      [facility] - [place], [place] - [address]  [address] [address]  Phone: [phone] Fax: [phone]     Local or Mail Order:  mail  Ordering Provider:  Dr. Dewey Groves Call Back Number:  [phone] fax# [phone]  Additional Information:  the caller stated that the called Monday to get a refill for the pt and have not heard back from the office. Please call caller back and advice. Thanks.

## 2022-12-20 PROBLEM — L98.499 NON-PRESSURE CHRONIC ULCER OF SKIN OF OTHER SITES WITH UNSPECIFIED SEVERITY: Status: ACTIVE | Noted: 2022-12-20

## 2022-12-28 ENCOUNTER — PATIENT MESSAGE (OUTPATIENT)
Dept: FAMILY MEDICINE | Facility: CLINIC | Age: 77
End: 2022-12-28
Payer: MEDICARE

## 2022-12-28 DIAGNOSIS — Z86.718 HISTORY OF DVT (DEEP VEIN THROMBOSIS): ICD-10-CM

## 2022-12-28 DIAGNOSIS — Z79.01 ANTICOAGULATED: ICD-10-CM

## 2022-12-28 DIAGNOSIS — Z86.711 HISTORY OF PULMONARY EMBOLISM: ICD-10-CM

## 2022-12-28 DIAGNOSIS — I82.812 SUPERFICIAL THROMBOSIS OF LEFT LOWER EXTREMITY: ICD-10-CM

## 2022-12-28 DIAGNOSIS — D68.51 FACTOR V LEIDEN MUTATION: ICD-10-CM

## 2022-12-30 ENCOUNTER — PATIENT MESSAGE (OUTPATIENT)
Dept: FAMILY MEDICINE | Facility: CLINIC | Age: 77
End: 2022-12-30
Payer: MEDICARE

## 2023-01-12 ENCOUNTER — PATIENT MESSAGE (OUTPATIENT)
Dept: FAMILY MEDICINE | Facility: CLINIC | Age: 78
End: 2023-01-12
Payer: MEDICARE

## 2023-02-28 ENCOUNTER — PATIENT MESSAGE (OUTPATIENT)
Dept: FAMILY MEDICINE | Facility: CLINIC | Age: 78
End: 2023-02-28
Payer: MEDICARE

## 2023-02-28 DIAGNOSIS — Z96.21 COCHLEAR IMPLANT FOLLOW-UP: Primary | ICD-10-CM

## 2023-02-28 DIAGNOSIS — Z48.89 COCHLEAR IMPLANT FOLLOW-UP: Primary | ICD-10-CM

## 2023-03-01 ENCOUNTER — PATIENT MESSAGE (OUTPATIENT)
Dept: FAMILY MEDICINE | Facility: CLINIC | Age: 78
End: 2023-03-01
Payer: MEDICARE

## 2023-08-09 DIAGNOSIS — R14.2 BELCHING: ICD-10-CM

## 2023-08-10 RX ORDER — PANTOPRAZOLE SODIUM 40 MG/1
40 TABLET, DELAYED RELEASE ORAL
Qty: 90 TABLET | Refills: 2 | Status: SHIPPED | OUTPATIENT
Start: 2023-08-10 | End: 2024-03-25 | Stop reason: SDUPTHER

## 2023-08-29 ENCOUNTER — OFFICE VISIT (OUTPATIENT)
Dept: FAMILY MEDICINE | Facility: CLINIC | Age: 78
End: 2023-08-29
Payer: MEDICARE

## 2023-08-29 VITALS
TEMPERATURE: 98 F | BODY MASS INDEX: 22.4 KG/M2 | WEIGHT: 147.81 LBS | RESPIRATION RATE: 16 BRPM | OXYGEN SATURATION: 97 % | DIASTOLIC BLOOD PRESSURE: 74 MMHG | HEIGHT: 68 IN | SYSTOLIC BLOOD PRESSURE: 126 MMHG | HEART RATE: 76 BPM

## 2023-08-29 DIAGNOSIS — R73.09 HEMOGLOBIN A1C LESS THAN 7.0%: ICD-10-CM

## 2023-08-29 DIAGNOSIS — E78.00 HYPERCHOLESTEREMIA: Primary | ICD-10-CM

## 2023-08-29 DIAGNOSIS — Z79.899 ENCOUNTER FOR LONG-TERM (CURRENT) USE OF MEDICATIONS: ICD-10-CM

## 2023-08-29 DIAGNOSIS — Z78.0 ASYMPTOMATIC MENOPAUSAL STATE: ICD-10-CM

## 2023-08-29 DIAGNOSIS — D68.51 FACTOR 5 LEIDEN MUTATION, HETEROZYGOUS: ICD-10-CM

## 2023-08-29 DIAGNOSIS — Z12.31 ENCOUNTER FOR SCREENING MAMMOGRAM FOR MALIGNANT NEOPLASM OF BREAST: ICD-10-CM

## 2023-08-29 PROCEDURE — 3074F SYST BP LT 130 MM HG: CPT | Mod: CPTII,S$GLB,, | Performed by: FAMILY MEDICINE

## 2023-08-29 PROCEDURE — 3288F PR FALLS RISK ASSESSMENT DOCUMENTED: ICD-10-PCS | Mod: CPTII,S$GLB,, | Performed by: FAMILY MEDICINE

## 2023-08-29 PROCEDURE — 1101F PT FALLS ASSESS-DOCD LE1/YR: CPT | Mod: CPTII,S$GLB,, | Performed by: FAMILY MEDICINE

## 2023-08-29 PROCEDURE — 99999 PR PBB SHADOW E&M-EST. PATIENT-LVL V: ICD-10-PCS | Mod: PBBFAC,,, | Performed by: FAMILY MEDICINE

## 2023-08-29 PROCEDURE — 1101F PR PT FALLS ASSESS DOC 0-1 FALLS W/OUT INJ PAST YR: ICD-10-PCS | Mod: CPTII,S$GLB,, | Performed by: FAMILY MEDICINE

## 2023-08-29 PROCEDURE — 1126F AMNT PAIN NOTED NONE PRSNT: CPT | Mod: CPTII,S$GLB,, | Performed by: FAMILY MEDICINE

## 2023-08-29 PROCEDURE — 3078F DIAST BP <80 MM HG: CPT | Mod: CPTII,S$GLB,, | Performed by: FAMILY MEDICINE

## 2023-08-29 PROCEDURE — 1160F RVW MEDS BY RX/DR IN RCRD: CPT | Mod: CPTII,S$GLB,, | Performed by: FAMILY MEDICINE

## 2023-08-29 PROCEDURE — 3078F PR MOST RECENT DIASTOLIC BLOOD PRESSURE < 80 MM HG: ICD-10-PCS | Mod: CPTII,S$GLB,, | Performed by: FAMILY MEDICINE

## 2023-08-29 PROCEDURE — 99397 PER PM REEVAL EST PAT 65+ YR: CPT | Mod: S$GLB,,, | Performed by: FAMILY MEDICINE

## 2023-08-29 PROCEDURE — 99397 PR PREVENTIVE VISIT,EST,65 & OVER: ICD-10-PCS | Mod: S$GLB,,, | Performed by: FAMILY MEDICINE

## 2023-08-29 PROCEDURE — 1160F PR REVIEW ALL MEDS BY PRESCRIBER/CLIN PHARMACIST DOCUMENTED: ICD-10-PCS | Mod: CPTII,S$GLB,, | Performed by: FAMILY MEDICINE

## 2023-08-29 PROCEDURE — 3074F PR MOST RECENT SYSTOLIC BLOOD PRESSURE < 130 MM HG: ICD-10-PCS | Mod: CPTII,S$GLB,, | Performed by: FAMILY MEDICINE

## 2023-08-29 PROCEDURE — 1159F PR MEDICATION LIST DOCUMENTED IN MEDICAL RECORD: ICD-10-PCS | Mod: CPTII,S$GLB,, | Performed by: FAMILY MEDICINE

## 2023-08-29 PROCEDURE — 1159F MED LIST DOCD IN RCRD: CPT | Mod: CPTII,S$GLB,, | Performed by: FAMILY MEDICINE

## 2023-08-29 PROCEDURE — 1126F PR PAIN SEVERITY QUANTIFIED, NO PAIN PRESENT: ICD-10-PCS | Mod: CPTII,S$GLB,, | Performed by: FAMILY MEDICINE

## 2023-08-29 PROCEDURE — 3288F FALL RISK ASSESSMENT DOCD: CPT | Mod: CPTII,S$GLB,, | Performed by: FAMILY MEDICINE

## 2023-08-29 PROCEDURE — 99999 PR PBB SHADOW E&M-EST. PATIENT-LVL V: CPT | Mod: PBBFAC,,, | Performed by: FAMILY MEDICINE

## 2023-08-31 ENCOUNTER — LAB VISIT (OUTPATIENT)
Dept: LAB | Facility: HOSPITAL | Age: 78
End: 2023-08-31
Attending: FAMILY MEDICINE
Payer: MEDICARE

## 2023-08-31 DIAGNOSIS — E78.00 HYPERCHOLESTEREMIA: ICD-10-CM

## 2023-08-31 DIAGNOSIS — R73.09 HEMOGLOBIN A1C LESS THAN 7.0%: ICD-10-CM

## 2023-08-31 DIAGNOSIS — Z79.899 ENCOUNTER FOR LONG-TERM (CURRENT) USE OF MEDICATIONS: ICD-10-CM

## 2023-08-31 DIAGNOSIS — D68.51 FACTOR 5 LEIDEN MUTATION, HETEROZYGOUS: ICD-10-CM

## 2023-08-31 LAB
ALBUMIN SERPL BCP-MCNC: 3.8 G/DL (ref 3.5–5.2)
ALP SERPL-CCNC: 65 U/L (ref 55–135)
ALT SERPL W/O P-5'-P-CCNC: 16 U/L (ref 10–44)
ANION GAP SERPL CALC-SCNC: 9 MMOL/L (ref 8–16)
AST SERPL-CCNC: 19 U/L (ref 10–40)
BASOPHILS # BLD AUTO: 0.04 K/UL (ref 0–0.2)
BASOPHILS NFR BLD: 0.8 % (ref 0–1.9)
BILIRUB SERPL-MCNC: 0.6 MG/DL (ref 0.1–1)
BUN SERPL-MCNC: 17 MG/DL (ref 8–23)
CALCIUM SERPL-MCNC: 9.2 MG/DL (ref 8.7–10.5)
CHLORIDE SERPL-SCNC: 106 MMOL/L (ref 95–110)
CHOLEST SERPL-MCNC: 159 MG/DL (ref 120–199)
CHOLEST/HDLC SERPL: 2.1 {RATIO} (ref 2–5)
CO2 SERPL-SCNC: 24 MMOL/L (ref 23–29)
CREAT SERPL-MCNC: 0.8 MG/DL (ref 0.5–1.4)
DIFFERENTIAL METHOD: NORMAL
EOSINOPHIL # BLD AUTO: 0.2 K/UL (ref 0–0.5)
EOSINOPHIL NFR BLD: 3.3 % (ref 0–8)
ERYTHROCYTE [DISTWIDTH] IN BLOOD BY AUTOMATED COUNT: 13.2 % (ref 11.5–14.5)
EST. GFR  (NO RACE VARIABLE): >60 ML/MIN/1.73 M^2
ESTIMATED AVG GLUCOSE: 100 MG/DL (ref 68–131)
GLUCOSE SERPL-MCNC: 86 MG/DL (ref 70–110)
HBA1C MFR BLD: 5.1 % (ref 4–5.6)
HCT VFR BLD AUTO: 40 % (ref 37–48.5)
HDLC SERPL-MCNC: 76 MG/DL (ref 40–75)
HDLC SERPL: 47.8 % (ref 20–50)
HGB BLD-MCNC: 13.4 G/DL (ref 12–16)
IMM GRANULOCYTES # BLD AUTO: 0.01 K/UL (ref 0–0.04)
IMM GRANULOCYTES NFR BLD AUTO: 0.2 % (ref 0–0.5)
LDLC SERPL CALC-MCNC: 69.2 MG/DL (ref 63–159)
LYMPHOCYTES # BLD AUTO: 2.1 K/UL (ref 1–4.8)
LYMPHOCYTES NFR BLD: 42.9 % (ref 18–48)
MCH RBC QN AUTO: 30.5 PG (ref 27–31)
MCHC RBC AUTO-ENTMCNC: 33.5 G/DL (ref 32–36)
MCV RBC AUTO: 91 FL (ref 82–98)
MONOCYTES # BLD AUTO: 0.4 K/UL (ref 0.3–1)
MONOCYTES NFR BLD: 8.1 % (ref 4–15)
NEUTROPHILS # BLD AUTO: 2.2 K/UL (ref 1.8–7.7)
NEUTROPHILS NFR BLD: 44.7 % (ref 38–73)
NONHDLC SERPL-MCNC: 83 MG/DL
NRBC BLD-RTO: 0 /100 WBC
PLATELET # BLD AUTO: 166 K/UL (ref 150–450)
PMV BLD AUTO: 9.7 FL (ref 9.2–12.9)
POTASSIUM SERPL-SCNC: 4.3 MMOL/L (ref 3.5–5.1)
PROT SERPL-MCNC: 6.8 G/DL (ref 6–8.4)
RBC # BLD AUTO: 4.4 M/UL (ref 4–5.4)
SODIUM SERPL-SCNC: 139 MMOL/L (ref 136–145)
TRIGL SERPL-MCNC: 69 MG/DL (ref 30–150)
TSH SERPL DL<=0.005 MIU/L-ACNC: 2.05 UIU/ML (ref 0.4–4)
WBC # BLD AUTO: 4.92 K/UL (ref 3.9–12.7)

## 2023-08-31 PROCEDURE — 84443 ASSAY THYROID STIM HORMONE: CPT | Performed by: FAMILY MEDICINE

## 2023-08-31 PROCEDURE — 36415 COLL VENOUS BLD VENIPUNCTURE: CPT | Performed by: FAMILY MEDICINE

## 2023-08-31 PROCEDURE — 83036 HEMOGLOBIN GLYCOSYLATED A1C: CPT | Performed by: FAMILY MEDICINE

## 2023-08-31 PROCEDURE — 80061 LIPID PANEL: CPT | Performed by: FAMILY MEDICINE

## 2023-08-31 PROCEDURE — 80053 COMPREHEN METABOLIC PANEL: CPT | Performed by: FAMILY MEDICINE

## 2023-08-31 PROCEDURE — 85025 COMPLETE CBC W/AUTO DIFF WBC: CPT | Performed by: FAMILY MEDICINE

## 2023-10-17 ENCOUNTER — HOSPITAL ENCOUNTER (OUTPATIENT)
Dept: RADIOLOGY | Facility: HOSPITAL | Age: 78
Discharge: HOME | End: 2023-10-17
Attending: FAMILY MEDICINE
Payer: MEDICARE

## 2023-10-17 DIAGNOSIS — Z12.31 ENCOUNTER FOR SCREENING MAMMOGRAM FOR MALIGNANT NEOPLASM OF BREAST: ICD-10-CM

## 2023-10-17 DIAGNOSIS — Z78.0 ASYMPTOMATIC MENOPAUSAL STATE: ICD-10-CM

## 2023-10-17 PROCEDURE — 77067 SCR MAMMO BI INCL CAD: CPT | Mod: TC

## 2023-10-17 PROCEDURE — 77063 BREAST TOMOSYNTHESIS BI: CPT | Mod: 26,,, | Performed by: RADIOLOGY

## 2023-10-17 PROCEDURE — 77063 MAMMO DIGITAL SCREENING BILAT WITH TOMO: ICD-10-PCS | Mod: 26,,, | Performed by: RADIOLOGY

## 2023-10-17 PROCEDURE — 77080 DXA BONE DENSITY AXIAL SKELETON 1 OR MORE SITES: ICD-10-PCS | Mod: 26,,, | Performed by: RADIOLOGY

## 2023-10-17 PROCEDURE — 77080 DXA BONE DENSITY AXIAL: CPT | Mod: 26,,, | Performed by: RADIOLOGY

## 2023-10-17 PROCEDURE — 77080 DXA BONE DENSITY AXIAL: CPT | Mod: TC

## 2023-10-17 PROCEDURE — 77067 MAMMO DIGITAL SCREENING BILAT WITH TOMO: ICD-10-PCS | Mod: 26,,, | Performed by: RADIOLOGY

## 2023-10-17 PROCEDURE — 77067 SCR MAMMO BI INCL CAD: CPT | Mod: 26,,, | Performed by: RADIOLOGY

## 2023-10-30 DIAGNOSIS — D68.51 FACTOR V LEIDEN MUTATION: ICD-10-CM

## 2023-10-30 DIAGNOSIS — I82.812 SUPERFICIAL THROMBOSIS OF LEFT LOWER EXTREMITY: ICD-10-CM

## 2023-10-30 DIAGNOSIS — Z79.01 ANTICOAGULATED: ICD-10-CM

## 2023-10-30 DIAGNOSIS — Z86.718 HISTORY OF DVT (DEEP VEIN THROMBOSIS): ICD-10-CM

## 2023-10-30 DIAGNOSIS — Z86.711 HISTORY OF PULMONARY EMBOLISM: ICD-10-CM

## 2024-03-25 ENCOUNTER — OFFICE VISIT (OUTPATIENT)
Dept: FAMILY MEDICINE | Facility: CLINIC | Age: 79
End: 2024-03-25
Payer: MEDICARE

## 2024-03-25 ENCOUNTER — LAB VISIT (OUTPATIENT)
Dept: LAB | Facility: HOSPITAL | Age: 79
End: 2024-03-25
Attending: FAMILY MEDICINE
Payer: MEDICARE

## 2024-03-25 VITALS
HEIGHT: 68 IN | HEART RATE: 74 BPM | WEIGHT: 148.19 LBS | SYSTOLIC BLOOD PRESSURE: 122 MMHG | RESPIRATION RATE: 14 BRPM | OXYGEN SATURATION: 99 % | DIASTOLIC BLOOD PRESSURE: 80 MMHG | BODY MASS INDEX: 22.46 KG/M2

## 2024-03-25 DIAGNOSIS — D68.51 FACTOR V LEIDEN MUTATION: ICD-10-CM

## 2024-03-25 DIAGNOSIS — Z00.00 ANNUAL PHYSICAL EXAM: ICD-10-CM

## 2024-03-25 DIAGNOSIS — Z13.29 THYROID DISORDER SCREEN: ICD-10-CM

## 2024-03-25 DIAGNOSIS — Z79.899 OTHER LONG TERM (CURRENT) DRUG THERAPY: ICD-10-CM

## 2024-03-25 DIAGNOSIS — Z76.89 ENCOUNTER TO ESTABLISH CARE WITH NEW DOCTOR: ICD-10-CM

## 2024-03-25 DIAGNOSIS — Z86.718 HISTORY OF DVT (DEEP VEIN THROMBOSIS): ICD-10-CM

## 2024-03-25 DIAGNOSIS — E78.00 HYPERCHOLESTEREMIA: ICD-10-CM

## 2024-03-25 DIAGNOSIS — R73.09 HEMOGLOBIN A1C LESS THAN 7.0%: ICD-10-CM

## 2024-03-25 DIAGNOSIS — E78.00 HYPERCHOLESTEREMIA: Primary | ICD-10-CM

## 2024-03-25 DIAGNOSIS — Z86.711 HISTORY OF PULMONARY EMBOLISM: ICD-10-CM

## 2024-03-25 DIAGNOSIS — Z79.01 ANTICOAGULATED: ICD-10-CM

## 2024-03-25 DIAGNOSIS — I82.812 SUPERFICIAL THROMBOSIS OF LEFT LOWER EXTREMITY: ICD-10-CM

## 2024-03-25 DIAGNOSIS — R14.2 BELCHING: ICD-10-CM

## 2024-03-25 LAB
25(OH)D3+25(OH)D2 SERPL-MCNC: 78 NG/ML (ref 30–96)
ALBUMIN SERPL BCP-MCNC: 3.8 G/DL (ref 3.5–5.2)
ALBUMIN/CREAT UR: NORMAL UG/MG (ref 0–30)
ALP SERPL-CCNC: 64 U/L (ref 55–135)
ALT SERPL W/O P-5'-P-CCNC: 19 U/L (ref 10–44)
ANION GAP SERPL CALC-SCNC: 10 MMOL/L (ref 8–16)
AST SERPL-CCNC: 20 U/L (ref 10–40)
BASOPHILS # BLD AUTO: 0.04 K/UL (ref 0–0.2)
BASOPHILS NFR BLD: 0.7 % (ref 0–1.9)
BILIRUB SERPL-MCNC: 0.5 MG/DL (ref 0.1–1)
BUN SERPL-MCNC: 15 MG/DL (ref 8–23)
CALCIUM SERPL-MCNC: 9.4 MG/DL (ref 8.7–10.5)
CHLORIDE SERPL-SCNC: 104 MMOL/L (ref 95–110)
CHOLEST SERPL-MCNC: 174 MG/DL (ref 120–199)
CHOLEST/HDLC SERPL: 2.2 {RATIO} (ref 2–5)
CO2 SERPL-SCNC: 26 MMOL/L (ref 23–29)
CREAT SERPL-MCNC: 0.8 MG/DL (ref 0.5–1.4)
CREAT UR-MCNC: 25 MG/DL (ref 15–325)
DIFFERENTIAL METHOD BLD: NORMAL
EOSINOPHIL # BLD AUTO: 0.1 K/UL (ref 0–0.5)
EOSINOPHIL NFR BLD: 2.3 % (ref 0–8)
ERYTHROCYTE [DISTWIDTH] IN BLOOD BY AUTOMATED COUNT: 13.3 % (ref 11.5–14.5)
EST. GFR  (NO RACE VARIABLE): >60 ML/MIN/1.73 M^2
ESTIMATED AVG GLUCOSE: 103 MG/DL (ref 68–131)
GLUCOSE SERPL-MCNC: 87 MG/DL (ref 70–110)
HBA1C MFR BLD: 5.2 % (ref 4–5.6)
HCT VFR BLD AUTO: 41.4 % (ref 37–48.5)
HDLC SERPL-MCNC: 80 MG/DL (ref 40–75)
HDLC SERPL: 46 % (ref 20–50)
HGB BLD-MCNC: 13.8 G/DL (ref 12–16)
IMM GRANULOCYTES # BLD AUTO: 0.01 K/UL (ref 0–0.04)
IMM GRANULOCYTES NFR BLD AUTO: 0.2 % (ref 0–0.5)
LDLC SERPL CALC-MCNC: 75 MG/DL (ref 63–159)
LYMPHOCYTES # BLD AUTO: 2.2 K/UL (ref 1–4.8)
LYMPHOCYTES NFR BLD: 38.7 % (ref 18–48)
MCH RBC QN AUTO: 30.6 PG (ref 27–31)
MCHC RBC AUTO-ENTMCNC: 33.3 G/DL (ref 32–36)
MCV RBC AUTO: 92 FL (ref 82–98)
MICROALBUMIN UR DL<=1MG/L-MCNC: <5 UG/ML
MONOCYTES # BLD AUTO: 0.5 K/UL (ref 0.3–1)
MONOCYTES NFR BLD: 8.3 % (ref 4–15)
NEUTROPHILS # BLD AUTO: 2.8 K/UL (ref 1.8–7.7)
NEUTROPHILS NFR BLD: 49.8 % (ref 38–73)
NONHDLC SERPL-MCNC: 94 MG/DL
NRBC BLD-RTO: 0 /100 WBC
PLATELET # BLD AUTO: 184 K/UL (ref 150–450)
PMV BLD AUTO: 9.4 FL (ref 9.2–12.9)
POTASSIUM SERPL-SCNC: 4.5 MMOL/L (ref 3.5–5.1)
PROT SERPL-MCNC: 6.6 G/DL (ref 6–8.4)
RBC # BLD AUTO: 4.51 M/UL (ref 4–5.4)
SODIUM SERPL-SCNC: 140 MMOL/L (ref 136–145)
TRIGL SERPL-MCNC: 95 MG/DL (ref 30–150)
TSH SERPL DL<=0.005 MIU/L-ACNC: 1.7 UIU/ML (ref 0.4–4)
VIT B12 SERPL-MCNC: 730 PG/ML (ref 210–950)
WBC # BLD AUTO: 5.55 K/UL (ref 3.9–12.7)

## 2024-03-25 PROCEDURE — 82607 VITAMIN B-12: CPT | Performed by: FAMILY MEDICINE

## 2024-03-25 PROCEDURE — 3079F DIAST BP 80-89 MM HG: CPT | Mod: CPTII,S$GLB,, | Performed by: FAMILY MEDICINE

## 2024-03-25 PROCEDURE — 84443 ASSAY THYROID STIM HORMONE: CPT | Performed by: FAMILY MEDICINE

## 2024-03-25 PROCEDURE — 1126F AMNT PAIN NOTED NONE PRSNT: CPT | Mod: CPTII,S$GLB,, | Performed by: FAMILY MEDICINE

## 2024-03-25 PROCEDURE — 82306 VITAMIN D 25 HYDROXY: CPT | Performed by: FAMILY MEDICINE

## 2024-03-25 PROCEDURE — 80053 COMPREHEN METABOLIC PANEL: CPT | Performed by: FAMILY MEDICINE

## 2024-03-25 PROCEDURE — 80061 LIPID PANEL: CPT | Performed by: FAMILY MEDICINE

## 2024-03-25 PROCEDURE — 1101F PT FALLS ASSESS-DOCD LE1/YR: CPT | Mod: CPTII,S$GLB,, | Performed by: FAMILY MEDICINE

## 2024-03-25 PROCEDURE — 82043 UR ALBUMIN QUANTITATIVE: CPT | Performed by: FAMILY MEDICINE

## 2024-03-25 PROCEDURE — 99999 PR PBB SHADOW E&M-EST. PATIENT-LVL III: CPT | Mod: PBBFAC,,, | Performed by: FAMILY MEDICINE

## 2024-03-25 PROCEDURE — 83036 HEMOGLOBIN GLYCOSYLATED A1C: CPT | Performed by: FAMILY MEDICINE

## 2024-03-25 PROCEDURE — 1159F MED LIST DOCD IN RCRD: CPT | Mod: CPTII,S$GLB,, | Performed by: FAMILY MEDICINE

## 2024-03-25 PROCEDURE — 36415 COLL VENOUS BLD VENIPUNCTURE: CPT | Performed by: FAMILY MEDICINE

## 2024-03-25 PROCEDURE — 3074F SYST BP LT 130 MM HG: CPT | Mod: CPTII,S$GLB,, | Performed by: FAMILY MEDICINE

## 2024-03-25 PROCEDURE — 85025 COMPLETE CBC W/AUTO DIFF WBC: CPT | Performed by: FAMILY MEDICINE

## 2024-03-25 PROCEDURE — 99214 OFFICE O/P EST MOD 30 MIN: CPT | Mod: S$GLB,,, | Performed by: FAMILY MEDICINE

## 2024-03-25 PROCEDURE — 3288F FALL RISK ASSESSMENT DOCD: CPT | Mod: CPTII,S$GLB,, | Performed by: FAMILY MEDICINE

## 2024-03-25 RX ORDER — ROSUVASTATIN CALCIUM 5 MG/1
5 TABLET, COATED ORAL NIGHTLY
Qty: 90 TABLET | Refills: 3 | Status: SHIPPED | OUTPATIENT
Start: 2024-03-25 | End: 2024-06-07 | Stop reason: SDUPTHER

## 2024-03-25 RX ORDER — PANTOPRAZOLE SODIUM 40 MG/1
40 TABLET, DELAYED RELEASE ORAL DAILY
Qty: 90 TABLET | Refills: 3 | Status: SHIPPED | OUTPATIENT
Start: 2024-03-25 | End: 2024-06-07 | Stop reason: SDUPTHER

## 2024-03-25 RX ORDER — FERROUS SULFATE, DRIED 160(50) MG
1 TABLET, EXTENDED RELEASE ORAL 2 TIMES DAILY WITH MEALS
COMMUNITY

## 2024-03-25 NOTE — PROGRESS NOTES
Subjective:       Patient ID: Kellie Monte is a 78 y.o. female.    Chief Complaint: Establish Care, Medication Refill, and Follow-up (6 month)      Past Medical History:   Diagnosis Date    Blood clot in vein     Deep vein thrombosis     while pregnant in , subsequent to that while on ocp, then 2 years ago. Recommended to have lifelong anticoag at [facility].    DVT (deep venous thrombosis)     While pregnant.    Factor 5 Leiden mutation, heterozygous     Gastritis 2020    Hematochezia 2020    History of DVT (deep vein thrombosis) 2019    Hypercholesterolemia     Internal hemorrhoids     Malignant neoplasm of skin     Non-melanoma skin cancer    OP (osteoporosis)     Osteoarthritis     Pulmonary embolism     Sigmoid polyp 2020       Past Surgical History:   Procedure Laterality Date    APPENDECTOMY      CHOLECYSTECTOMY      COLONOSCOPY       Jt    COLONOSCOPY N/A 3/20/2020    Procedure: COLONOSCOPY;  Surgeon: Omar Ramirez MD;  Location: [facility] ENDO;  Service: General;  Laterality: N/A;    cyst removed ENT (throat)      ESOPHAGOGASTRODUODENOSCOPY N/A 3/20/2020    Procedure: ESOPHAGOGASTRODUODENOSCOPY (EGD);  Surgeon: Omar Ramirez MD;  Location: [facility];  Service: General;  Laterality: N/A;    INNER EAR SURGERY Left     Baja Implant    SKIN CANCER EXCISION      tonsils      TUBAL LIGATION          Social History     Socioeconomic History    Marital status:    Tobacco Use    Smoking status: Former     Current packs/day: 0.00     Average packs/day: 2.0 packs/day for 12.0 years (24.0 ttl pk-yrs)     Types: Cigarettes     Start date: 1970     Quit date: 1982     Years since quittin.1    Smokeless tobacco: Never   Substance and Sexual Activity    Alcohol use: Yes     Alcohol/week: 1.0 standard drink of alcohol     Types: 1 Glasses of wine per week     Comment: socially    Drug use: No    Sexual activity: Not Currently     Birth control/protection: None        Family History   Problem Relation Age of Onset    Cancer Father 75        bone    Clotting disorder Father     Cancer Mother 80        colon    Colon cancer Mother     Angina Mother     Clotting disorder Mother     Clotting disorder Brother     Heart disease Maternal Grandmother     Heart disease Paternal Grandmother     Breast cancer Neg Hx     Ovarian cancer Neg Hx        Review of patient's allergies indicates:  No Known Allergies       Current Outpatient Medications:     calcium-vitamin D3 (CALCIUM 500 + D) 500 mg-5 mcg (200 unit) per tablet, Take 1 tablet by mouth 2 (two) times daily with meals., Disp: , Rfl:     latanoprost 0.005 % ophthalmic solution, Place 1 drop into both eyes every evening., Disp: , Rfl:     multivit-mineral-iron-lutein Tab, Take 1 tablet by mouth every morning., Disp: , Rfl:     pantoprazole (PROTONIX) 40 MG tablet, Take 1 tablet (40 mg total) by mouth once daily., Disp: 90 tablet, Rfl: 3    rivaroxaban (XARELTO) 20 mg Tab, Take 1 tablet (20 mg total) by mouth every evening., Disp: 90 tablet, Rfl: 0    rosuvastatin (CRESTOR) 5 MG tablet, Take 1 tablet (5 mg total) by mouth every evening., Disp: 90 tablet, Rfl: 3    Kellie Monte is a 78-year-old female with mixed hyperlipidemia, history of DVT and PE currently on Xarelto, factor 5 leiden deficiency, GERD, osteoporosis, osteoarthritis who is here to establish care today, as her previous PCP as left the area. She does take xarelto for the factor V leiden  deficiency. She has not had a clot in 15 years, and the clot was in her left lower extremity. She was not on a blood thinner at that time. Ms Monte also has glaucoma and is followed by Dr Tellez    Her last labs were done on 2023 and have been reviewed.  They consist of the followin. CBC within normal limits  2. CMP within normal limits  3. Lipid panel with total cholesterol 159, HDL 76, LDL 69, triglycerides 69, total cholesterol/HDL 2.1  4. Hemoglobin A1c 5.1  5. TSH  2.051  6. Next DEXA scan due 10/17/2026, next mammogram due 10/17/2024   Review of Systems   Constitutional:  Negative for fatigue and unexpected weight change.   HENT:  Positive for postnasal drip.    Respiratory:  Negative for cough and shortness of breath.    Cardiovascular:  Negative for chest pain.   Genitourinary: Negative.    Musculoskeletal:  Positive for arthralgias.        Arthritis well controlled, notices it more with the weather cahnges   Allergic/Immunologic: Positive for environmental allergies.       Objective:      Physical Exam  Vitals and nursing note reviewed.   Constitutional:       Appearance: Normal appearance. She is normal weight.   HENT:      Head: Normocephalic.      Nose: Nose normal.   Eyes:      Extraocular Movements: Extraocular movements intact.      Conjunctiva/sclera: Conjunctivae normal.      Pupils: Pupils are equal, round, and reactive to light.   Cardiovascular:      Rate and Rhythm: Normal rate and regular rhythm.      Pulses:           Dorsalis pedis pulses are 3+ on the right side and 3+ on the left side.        Posterior tibial pulses are 3+ on the right side and 3+ on the left side.      Heart sounds: Normal heart sounds. No murmur heard.  Pulmonary:      Effort: Pulmonary effort is normal. No respiratory distress.      Breath sounds: Normal breath sounds.   Musculoskeletal:         General: Normal range of motion.      Cervical back: Normal range of motion and neck supple.   Feet:      Right foot:      Protective Sensation: 6 sites tested.  6 sites sensed.      Skin integrity: Skin integrity normal.      Toenail Condition: Right toenails are normal.      Left foot:      Protective Sensation: 6 sites tested.  6 sites sensed.      Skin integrity: Skin integrity normal.      Toenail Condition: Left toenails are normal.   Skin:     General: Skin is warm and dry.   Neurological:      General: No focal deficit present.      Mental Status: She is alert and oriented to person,  place, and time.   Psychiatric:         Mood and Affect: Mood normal.         Behavior: Behavior normal.         Assessment:       1. Hypercholesteremia    2. Factor V Leiden mutation    3. Anticoagulated    4. Superficial thrombosis of left lower extremity    5. History of DVT (deep vein thrombosis)    6. History of pulmonary embolism    7. Hemoglobin A1c less than 7.0%    8. Annual physical exam    9. Thyroid disorder screen    10. Encounter to establish care with new doctor    11. Other long term (current) drug therapy    12. Belching        Plan:         Hypercholesteremia  -     Lipid Panel; Future; Expected date: 03/25/2024  -     rosuvastatin (CRESTOR) 5 MG tablet; Take 1 tablet (5 mg total) by mouth every evening.  Dispense: 90 tablet; Refill: 3    Factor V Leiden mutation  -     rivaroxaban (XARELTO) 20 mg Tab; Take 1 tablet (20 mg total) by mouth every evening.  Dispense: 90 tablet; Refill: 0    Anticoagulated  -     rivaroxaban (XARELTO) 20 mg Tab; Take 1 tablet (20 mg total) by mouth every evening.  Dispense: 90 tablet; Refill: 0  -     CBC Auto Differential; Future; Expected date: 03/25/2024  -     Comprehensive Metabolic Panel; Future; Expected date: 03/25/2024    Superficial thrombosis of left lower extremity  -     rivaroxaban (XARELTO) 20 mg Tab; Take 1 tablet (20 mg total) by mouth every evening.  Dispense: 90 tablet; Refill: 0    History of DVT (deep vein thrombosis)  -     rivaroxaban (XARELTO) 20 mg Tab; Take 1 tablet (20 mg total) by mouth every evening.  Dispense: 90 tablet; Refill: 0    History of pulmonary embolism  -     rivaroxaban (XARELTO) 20 mg Tab; Take 1 tablet (20 mg total) by mouth every evening.  Dispense: 90 tablet; Refill: 0    Hemoglobin A1c less than 7.0%  -     Hemoglobin A1C; Future; Expected date: 03/25/2024    Annual physical exam  -     Vitamin D; Future; Expected date: 03/25/2024  -     Vitamin B12; Future; Expected date: 03/25/2024  -     Microalbumin/Creatinine Ratio,  Urine; Future; Expected date: 03/25/2024  -     Lipid Panel; Future; Expected date: 03/25/2024  -     CBC Auto Differential; Future; Expected date: 03/25/2024  -     Comprehensive Metabolic Panel; Future; Expected date: 03/25/2024  -     Hemoglobin A1C; Future; Expected date: 03/25/2024  -     TSH; Future; Expected date: 03/25/2024    Thyroid disorder screen  -     TSH; Future; Expected date: 03/25/2024    Encounter to establish care with new doctor  -     Vitamin D; Future; Expected date: 03/25/2024  -     Vitamin B12; Future; Expected date: 03/25/2024  -     Microalbumin/Creatinine Ratio, Urine; Future; Expected date: 03/25/2024  -     Lipid Panel; Future; Expected date: 03/25/2024  -     CBC Auto Differential; Future; Expected date: 03/25/2024  -     Comprehensive Metabolic Panel; Future; Expected date: 03/25/2024  -     Hemoglobin A1C; Future; Expected date: 03/25/2024  -     TSH; Future; Expected date: 03/25/2024    Other long term (current) drug therapy  -     Vitamin D; Future; Expected date: 03/25/2024  -     Vitamin B12; Future; Expected date: 03/25/2024  -     TSH; Future; Expected date: 03/25/2024    Belching  -     pantoprazole (PROTONIX) 40 MG tablet; Take 1 tablet (40 mg total) by mouth once daily.  Dispense: 90 tablet; Refill: 3        Risks, benefits, and side effects were discussed with the patient. All questions were answered to the fullest satisfaction of the patient, and pt verbalized understanding and agreement to treatment plan. Pt was to call with any new or worsening symptoms, or present to the ER.        Jaymie Cadena MD

## 2024-04-17 ENCOUNTER — TELEPHONE (OUTPATIENT)
Dept: FAMILY MEDICINE | Facility: CLINIC | Age: 79
End: 2024-04-17
Payer: MEDICARE

## 2024-06-07 DIAGNOSIS — Z86.711 HISTORY OF PULMONARY EMBOLISM: ICD-10-CM

## 2024-06-07 DIAGNOSIS — R14.2 BELCHING: ICD-10-CM

## 2024-06-07 DIAGNOSIS — I82.812 SUPERFICIAL THROMBOSIS OF LEFT LOWER EXTREMITY: ICD-10-CM

## 2024-06-07 DIAGNOSIS — E78.00 HYPERCHOLESTEREMIA: ICD-10-CM

## 2024-06-07 DIAGNOSIS — D68.51 FACTOR V LEIDEN MUTATION: ICD-10-CM

## 2024-06-07 DIAGNOSIS — Z79.01 ANTICOAGULATED: ICD-10-CM

## 2024-06-07 DIAGNOSIS — Z86.718 HISTORY OF DVT (DEEP VEIN THROMBOSIS): ICD-10-CM

## 2024-06-07 RX ORDER — ROSUVASTATIN CALCIUM 5 MG/1
5 TABLET, COATED ORAL NIGHTLY
Qty: 90 TABLET | Refills: 3 | Status: SHIPPED | OUTPATIENT
Start: 2024-06-07

## 2024-06-07 RX ORDER — PANTOPRAZOLE SODIUM 40 MG/1
40 TABLET, DELAYED RELEASE ORAL DAILY
Qty: 90 TABLET | Refills: 3 | Status: SHIPPED | OUTPATIENT
Start: 2024-06-07

## 2024-06-07 NOTE — TELEPHONE ENCOUNTER
No care due was identified.  [facility] Embedded Care Due Messages. Reference number: [id number].   6/07/2024 1:12:59 PM CDT

## 2024-06-14 DIAGNOSIS — Z86.711 HISTORY OF PULMONARY EMBOLISM: ICD-10-CM

## 2024-06-14 DIAGNOSIS — I82.812 SUPERFICIAL THROMBOSIS OF LEFT LOWER EXTREMITY: ICD-10-CM

## 2024-06-14 DIAGNOSIS — Z79.01 ANTICOAGULATED: ICD-10-CM

## 2024-06-14 DIAGNOSIS — D68.51 FACTOR V LEIDEN MUTATION: ICD-10-CM

## 2024-06-14 DIAGNOSIS — Z86.718 HISTORY OF DVT (DEEP VEIN THROMBOSIS): ICD-10-CM

## 2024-08-19 ENCOUNTER — PATIENT OUTREACH (OUTPATIENT)
Dept: ADMINISTRATIVE | Facility: HOSPITAL | Age: 79
End: 2024-08-19
Payer: MEDICARE

## 2024-08-19 ENCOUNTER — PATIENT MESSAGE (OUTPATIENT)
Dept: ADMINISTRATIVE | Facility: HOSPITAL | Age: 79
End: 2024-08-19
Payer: MEDICARE

## 2024-08-19 DIAGNOSIS — D68.51 FACTOR V LEIDEN MUTATION: ICD-10-CM

## 2024-08-19 DIAGNOSIS — Z86.711 HISTORY OF PULMONARY EMBOLISM: ICD-10-CM

## 2024-08-19 DIAGNOSIS — Z12.31 ENCOUNTER FOR SCREENING MAMMOGRAM FOR MALIGNANT NEOPLASM OF BREAST: Primary | ICD-10-CM

## 2024-08-19 DIAGNOSIS — Z79.01 ANTICOAGULATED: ICD-10-CM

## 2024-08-19 DIAGNOSIS — I82.812 SUPERFICIAL THROMBOSIS OF LEFT LOWER EXTREMITY: ICD-10-CM

## 2024-08-19 DIAGNOSIS — Z86.718 HISTORY OF DVT (DEEP VEIN THROMBOSIS): ICD-10-CM

## 2024-08-19 RX ORDER — RIVAROXABAN 20 MG/1
20 TABLET, FILM COATED ORAL NIGHTLY
Qty: 90 TABLET | Refills: 1 | Status: SHIPPED | OUTPATIENT
Start: 2024-08-19

## 2024-08-19 NOTE — PROGRESS NOTES
Population Health Chart Review & Patient Outreach Details      Additional [facility] Health Notes:               Updates Requested / Reviewed:      Updated Care Coordination Note, Care Everywhere, and Immunizations Reconciliation Completed or Queried: [facility] Topics Overdue:      [facility] Score: 0     Patient is not due for any topics at this time.    Tetanus Vaccine  Shingles/Zoster Vaccine  RSV Vaccine                  Health Maintenance Topic(s) Outreach Outcomes & Actions Taken:    Breast Cancer Screening - Outreach Outcomes & Actions Taken  : Mammogram Order Placed and Mammogram Screening Scheduled

## 2024-08-28 ENCOUNTER — HOSPITAL ENCOUNTER (OUTPATIENT)
Dept: RADIOLOGY | Facility: HOSPITAL | Age: 79
Discharge: HOME | End: 2024-08-28
Attending: STUDENT IN AN ORGANIZED HEALTH CARE EDUCATION/TRAINING PROGRAM
Payer: MEDICARE

## 2024-08-28 DIAGNOSIS — H46.9 OPTIC NEUROPATHY: ICD-10-CM

## 2024-08-28 LAB
CREAT SERPL-MCNC: 0.9 MG/DL (ref 0.5–1.4)
SAMPLE: NORMAL

## 2024-08-28 PROCEDURE — 70543 MRI ORBT/FAC/NCK W/O &W/DYE: CPT | Mod: TC

## 2024-08-28 PROCEDURE — 82565 ASSAY OF CREATININE: CPT

## 2024-08-28 PROCEDURE — 25500020 PHARM REV CODE 255

## 2024-08-28 PROCEDURE — 99900035 HC TECH TIME PER 15 MIN (STAT)

## 2024-08-28 PROCEDURE — A9585 GADOBUTROL INJECTION: HCPCS

## 2024-08-28 PROCEDURE — 70553 MRI BRAIN STEM W/O & W/DYE: CPT | Mod: 26,,, | Performed by: RADIOLOGY

## 2024-08-28 PROCEDURE — 70543 MRI ORBT/FAC/NCK W/O &W/DYE: CPT | Mod: 26,,, | Performed by: RADIOLOGY

## 2024-08-28 RX ORDER — GADOBUTROL 604.72 MG/ML
7 INJECTION INTRAVENOUS
Status: COMPLETED | OUTPATIENT
Start: 2024-08-28 | End: 2024-08-28

## 2024-08-28 RX ADMIN — GADOBUTROL 7 ML: 604.72 INJECTION INTRAVENOUS at 01:08

## 2024-09-23 ENCOUNTER — TELEPHONE (OUTPATIENT)
Dept: FAMILY MEDICINE | Facility: CLINIC | Age: 79
End: 2024-09-23
Payer: MEDICARE

## 2024-09-23 NOTE — TELEPHONE ENCOUNTER
Attempted to contact patient to reschedule appt, no answer, left VM for patient to call back to reschedule

## 2024-09-30 ENCOUNTER — OFFICE VISIT (OUTPATIENT)
Dept: FAMILY MEDICINE | Facility: CLINIC | Age: 79
End: 2024-09-30
Payer: MEDICARE

## 2024-09-30 VITALS
BODY MASS INDEX: 22.78 KG/M2 | SYSTOLIC BLOOD PRESSURE: 110 MMHG | DIASTOLIC BLOOD PRESSURE: 80 MMHG | HEIGHT: 68 IN | WEIGHT: 150.31 LBS | HEART RATE: 66 BPM | OXYGEN SATURATION: 98 %
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
## 2024-09-30 DIAGNOSIS — E78.49 HYPERLIPIDEMIA DUE TO DIETARY FAT INTAKE: ICD-10-CM

## 2024-09-30 DIAGNOSIS — D68.51 FACTOR 5 LEIDEN MUTATION, HETEROZYGOUS: ICD-10-CM

## 2024-09-30 PROBLEM — L98.499 NON-PRESSURE CHRONIC ULCER OF SKIN OF OTHER SITES WITH UNSPECIFIED SEVERITY: Status: RESOLVED | Noted: 2022-12-20 | Resolved: 2024-09-30

## 2024-09-30 PROCEDURE — 99999 PR PBB SHADOW E&M-EST. PATIENT-LVL III: CPT | Mod: PBBFAC,,, | Performed by: STUDENT IN AN ORGANIZED HEALTH CARE EDUCATION/TRAINING PROGRAM

## 2024-09-30 RX ORDER — BETAMETHASONE VALERATE 1 MG/G
CREAM TOPICAL 2 TIMES DAILY
Qty: 45 G | Refills: 0 | Status: SHIPPED | OUTPATIENT
Start: 2024-09-30

## 2024-09-30 RX ORDER — HYDROXYZINE HYDROCHLORIDE 10 MG/1
10 TABLET, FILM COATED ORAL 3 TIMES DAILY PRN
Qty: 30 TABLET | Refills: 0 | Status: SHIPPED | OUTPATIENT
Start: 2024-09-30

## 2024-09-30 NOTE — PROGRESS NOTES
SUBJECTIVE:    CHIEF COMPLAINT:   Chief Complaint   Patient presents with    Follow-up    Insect Bite           274}    HISTORY OF PRESENT ILLNESS:  Kellie Monte is a 78 y.o. female who presents to the clinic today for follow up     Patient was cleaning her house and developed a diffuse erythematous big bites on her UE and LE. Patient has tried home remedies with minima relief and hot shower. Patient never had symptoms previously.      Patient is on xarelto for her factor V leiden mutation. And is stable.     PAST MEDICAL HISTORY:     274}  Past Medical History:   Diagnosis Date    Blood clot in vein     Deep vein thrombosis     while pregnant in 1972, subsequent to that while on ocp, then 2 years ago. Recommended to have lifelong anticoag at [facility].    DVT (deep venous thrombosis) 1972    While pregnant.    Factor 5 Leiden mutation, heterozygous     Gastritis 03/20/2020    Hematochezia 01/2020    History of DVT (deep vein thrombosis) 8/20/2019    Hypercholesterolemia     Internal hemorrhoids     Malignant neoplasm of skin     Non-melanoma skin cancer    OP (osteoporosis)     Osteoarthritis     Pulmonary embolism     Sigmoid polyp 03/20/2020       PAST SURGICAL HISTORY:  Past Surgical History:   Procedure Laterality Date    APPENDECTOMY      CHOLECYSTECTOMY      COLONOSCOPY  8/07     Jt    COLONOSCOPY N/A 3/20/2020    Procedure: COLONOSCOPY;  Surgeon: Omar Ramirez MD;  Location: [facility];  Service: General;  Laterality: N/A;    cyst removed ENT (throat)      ESOPHAGOGASTRODUODENOSCOPY N/A 3/20/2020    Procedure: ESOPHAGOGASTRODUODENOSCOPY (EGD);  Surgeon: Omar Ramirez MD;  Location: [facility];  Service: General;  Laterality: N/A;    INNER EAR SURGERY Left     Baja Implant    SKIN CANCER EXCISION      tonsils      TUBAL LIGATION         SOCIAL HISTORY:  Social History     Socioeconomic History    Marital status:    Tobacco Use    Smoking status: Former     Current packs/day: 0.00     Average  packs/day: 2.0 packs/day for 12.0 years (24.0 ttl pk-yrs)     Types: Cigarettes     Start date: 1970     Quit date: 1982     Years since quittin.6    Smokeless tobacco: Never   Substance and Sexual Activity    Alcohol use: Yes     Alcohol/week: 1.0 standard drink of alcohol     Types: 1 Glasses of wine per week     Comment: 1x weekly    Drug use: No    Sexual activity: Not Currently     Birth control/protection: None     Social Drivers of Health     Financial Resource Strain: Low Risk  (2024)    Overall Financial Resource Strain (CARDIA)     Difficulty of Paying Living Expenses: Not very hard   Food Insecurity: No Food Insecurity (2024)    Hunger Vital Sign     Worried About Running Out of Food in the Last Year: Never true     Ran Out of Food in the Last Year: Never true   Physical Activity: Insufficiently Active (2024)    Exercise Vital Sign     Days of Exercise per Week: 6 days     Minutes of Exercise per Session: 20 min   Stress: No Stress Concern Present (2024)    Citizen of [place] [employer] - Occupational Stress Questionnaire     Feeling of Stress : Not at all   Housing Stability: Unknown (2024)    Housing Stability Vital Sign     Unable to Pay for Housing in the Last Year: No       FAMILY HISTORY:       Family History   Problem Relation Name Age of Onset    Cancer Father  75        bone    Clotting disorder Father      Cancer Mother  80        colon    Colon cancer Mother      Angina Mother      Clotting disorder Mother      Clotting disorder Brother      Heart disease Maternal Grandmother      Heart disease Paternal Grandmother      Breast cancer Neg Hx      Ovarian cancer Neg Hx         ALLERGIES AND MEDICATIONS: updated and reviewed.      274}  Review of patient's allergies indicates:  No Known Allergies  Medication List with Changes/Refills   New Medications    BETAMETHASONE VALERATE 0.1% (VALISONE) 0.1 % CREA    Apply topically 2 (two) times daily.     HYDROXYZINE HCL (ATARAX) 10 MG TAB    Take 1 tablet (10 mg total) by mouth 3 (three) times daily as needed (cough).   Current Medications    CALCIUM-VITAMIN D3 (CALCIUM 500 + D) 500 MG-5 MCG (200 UNIT) PER TABLET    Take 1 tablet by mouth 2 (two) times daily with meals.    LATANOPROST 0.005 % OPHTHALMIC SOLUTION    Place 1 drop into both eyes every evening.    MULTIVIT-MINERAL-IRON-LUTEIN TAB    Take 1 tablet by mouth every morning.    PANTOPRAZOLE (PROTONIX) 40 MG TABLET    Take 1 tablet (40 mg total) by mouth once daily.    RIVAROXABAN (XARELTO) 20 MG TAB    TAKE 1 TABLET EVERY EVENING    ROSUVASTATIN (CRESTOR) 5 MG TABLET    Take 1 tablet (5 mg total) by mouth every evening.       SCREENING HISTORY:    274}  Health Maintenance         Date Due Completion Date    Shingles Vaccine (1 of 2) Never done ---    RSV Vaccine (Age 60+ and Pregnant patients) (1 - 1-dose 75+ series) Never done ---    TETANUS VACCINE 06/17/2024 6/17/2014 (Done)    Override on 6/17/2014: Done (received at local health dept)    Influenza Vaccine (1) 09/01/2024 3/25/2024    Override on 10/29/2019: Done    COVID-19 Vaccine (4 - 2024-25 season) 09/01/2024 9/11/2021    Mammogram 10/17/2024 10/17/2023    Override on 4/30/2019: Done    DEXA Scan 10/17/2026 10/17/2023    Override on 8/9/2016: Done    Lipid Panel 03/25/2029 3/25/2024            REVIEW OF SYSTEMS:   Review of Systems   Constitutional:  Negative for chills, fever, malaise/fatigue and weight loss.   Respiratory:  Negative for cough, shortness of breath and wheezing.    Cardiovascular:  Negative for chest pain, palpitations and leg swelling.   Gastrointestinal:  Negative for abdominal pain, diarrhea, heartburn, nausea and vomiting.   Musculoskeletal:  Negative for back pain, joint pain, myalgias and neck pain.   Skin:  Positive for itching and rash.   Neurological:  Negative for dizziness, tingling, tremors, sensory change and headaches.   Psychiatric/Behavioral:  Negative for depression.  "The patient is not nervous/anxious.        PHYSICAL EXAM:      274}  /80 (BP Location: Left arm, Patient Position: Sitting)   Pulse 66   Ht 5' 8" (1.727 m)   Wt 68.2 kg (150 lb 4.8 oz)   SpO2 98%   BMI 22.85 kg/m²   Wt Readings from Last 3 Encounters:   09/30/24 68.2 kg (150 lb 4.8 oz)   03/25/24 67.2 kg (148 lb 3.2 oz)   08/29/23 67 kg (147 lb 12.8 oz)     BP Readings from Last 3 Encounters:   09/30/24 110/80   03/25/24 122/80   08/29/23 126/74     Estimated body mass index is 22.85 kg/m² as calculated from the following:    Height as of this encounter: 5' 8" (1.727 m).    Weight as of this encounter: 68.2 kg (150 lb 4.8 oz).     Physical Exam  Constitutional:       Appearance: Normal appearance.   HENT:      Head: Normocephalic and atraumatic.      Nose: Nose normal.      Mouth/Throat:      Mouth: Mucous membranes are dry.      Pharynx: Oropharynx is clear.   Eyes:      Extraocular Movements: Extraocular movements intact.      Conjunctiva/sclera: Conjunctivae normal.   Cardiovascular:      Rate and Rhythm: Normal rate and regular rhythm.   Pulmonary:      Effort: Pulmonary effort is normal.      Breath sounds: Normal breath sounds.   Abdominal:      General: Bowel sounds are normal.      Palpations: Abdomen is soft.   Musculoskeletal:         General: Normal range of motion.      Cervical back: Normal range of motion.   Skin:     Comments: There is diffuse bug bite rash on her upper and lower extremities   Neurological:      Mental Status: She is alert.   Psychiatric:         Mood and Affect: Mood normal.         Thought Content: Thought content normal.         LABS:   274}  I have reviewed old labs below:  Lab Results   Component Value Date    WBC 5.55 03/25/2024    HGB 13.8 03/25/2024    HCT 41.4 03/25/2024    MCV 92 03/25/2024     03/25/2024     03/25/2024    K 4.5 03/25/2024     03/25/2024    CALCIUM 9.4 03/25/2024    CO2 26 03/25/2024    GLU 87 03/25/2024    BUN 15 03/25/2024    " CREATININE 0.8 03/25/2024    ANIONGAP 10 03/25/2024    ESTGFRAFRICA >60.0 06/21/2021    EGFRNONAA >60.0 06/21/2021    PROT 6.6 03/25/2024    ALBUMIN 3.8 03/25/2024    BILITOT 0.5 03/25/2024    ALKPHOS 64 03/25/2024    ALT 19 03/25/2024    AST 20 03/25/2024    INR 1.5 (H) 05/27/2020    CHOL 174 03/25/2024    TRIG 95 03/25/2024    HDL 80 (H) 03/25/2024    LDLCALC 75.0 03/25/2024    TSH 1.698 03/25/2024    HGBA1C 5.2 03/25/2024       ASSESSMENT AND PLAN:  274}  1. Bug bite, initial encounter  - betamethasone valerate 0.1% (VALISONE) 0.1 % Crea; Apply topically 2 (two) times daily.  Dispense: 45 g; Refill: 0  - hydrOXYzine HCL (ATARAX) 10 MG Tab; Take 1 tablet (10 mg total) by mouth 3 (three) times daily as needed (cough).  Dispense: 30 tablet; Refill: 0    2. Factor 5 Leiden mutation, heterozygous    3. Hyperlipidemia due to dietary fat intake   Controlled on crestor      No orders of the defined types were placed in this encounter.      No follow-ups on file. or sooner as needed.
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
## 2024-10-15 RX ORDER — HYDROXYZINE HYDROCHLORIDE 10 MG/1
10 TABLET, FILM COATED ORAL 3 TIMES DAILY PRN
Qty: 30 TABLET | Refills: 0 | Status: SHIPPED | OUTPATIENT
Start: 2024-10-15

## 2024-10-15 NOTE — TELEPHONE ENCOUNTER
Refill Routing Note   Medication(s) are not appropriate for processing by [facility] for the following reason(s):        Outside of protocol    [facility] action(s):  Route             Appointments  past 12m or future 3m with PCP    Date Provider   Last Visit   3/25/2024 Jaymie Cadena MD   Next Visit   3/31/2025 Jaymie Cadena MD   ED visits in past 90 days: 0        Note composed:1:50 PM 10/15/2024
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
## 2024-10-16 RX ORDER — HYDROXYZINE HYDROCHLORIDE 10 MG/1
10 TABLET, FILM COATED ORAL 3 TIMES DAILY PRN
Qty: 30 TABLET | Refills: 0 | OUTPATIENT
Start: 2024-10-16

## 2024-10-16 NOTE — TELEPHONE ENCOUNTER
Refill Decision Note   Kellie Monte  is requesting a refill authorization.  Brief Assessment and Rationale for Refill:  Quick Discontinue     Medication Therapy Plan:  Receipt confirmed by pharmacy (10/15/2024  5:18 PM CDT)      Comments:     Note composed:1:47 PM 10/16/2024

## 2024-10-18 ENCOUNTER — HOSPITAL ENCOUNTER (OUTPATIENT)
Dept: RADIOLOGY | Facility: HOSPITAL | Age: 79
Discharge: HOME | End: 2024-10-18
Attending: FAMILY MEDICINE
Payer: MEDICARE

## 2024-10-18 DIAGNOSIS — Z12.31 ENCOUNTER FOR SCREENING MAMMOGRAM FOR MALIGNANT NEOPLASM OF BREAST: ICD-10-CM

## 2024-10-18 PROCEDURE — 77067 SCR MAMMO BI INCL CAD: CPT | Mod: 26,,, | Performed by: RADIOLOGY

## 2024-10-18 PROCEDURE — 77067 SCR MAMMO BI INCL CAD: CPT | Mod: TC

## 2024-10-18 PROCEDURE — 77063 BREAST TOMOSYNTHESIS BI: CPT | Mod: TC

## 2024-10-18 PROCEDURE — 77063 BREAST TOMOSYNTHESIS BI: CPT | Mod: 26,,, | Performed by: RADIOLOGY

## 2025-01-16 ENCOUNTER — TELEPHONE (OUTPATIENT)
Dept: FAMILY MEDICINE | Facility: CLINIC | Age: 80
End: 2025-01-16
Payer: MEDICARE

## 2025-01-16 DIAGNOSIS — Z86.711 HISTORY OF PULMONARY EMBOLISM: ICD-10-CM

## 2025-01-16 DIAGNOSIS — D68.51 FACTOR V LEIDEN MUTATION: ICD-10-CM

## 2025-01-16 DIAGNOSIS — I82.812 SUPERFICIAL THROMBOSIS OF LEFT LOWER EXTREMITY: ICD-10-CM

## 2025-01-16 DIAGNOSIS — Z79.01 ANTICOAGULATED: ICD-10-CM

## 2025-01-16 DIAGNOSIS — R14.2 BELCHING: ICD-10-CM

## 2025-01-16 DIAGNOSIS — Z86.718 HISTORY OF DVT (DEEP VEIN THROMBOSIS): ICD-10-CM

## 2025-01-16 NOTE — TELEPHONE ENCOUNTER
Spoke w/ pt  Pt changing pharmacy d/t insurance.   Pt new pharmacy is [facility]  Pt does not need prescription at this time.

## 2025-01-16 NOTE — TELEPHONE ENCOUNTER
----- Message from Zahida sent at 1/16/2025  9:58 AM CST -----  Regarding: Meds Issue  Carole alarcon I have Miss Kellie with here today wanting to speak with Dr. Cadena staff about her medications and insurance. Can someone please reach out with pt asap as she said it's urgent. Thank you

## 2025-01-16 NOTE — LETTER
January 16, 2025                 [facility]  [address]  [address]  [place] [place] [address]  Phone: [phone]  Fax: [phone] To whom it may concern,      Ms. Kellie Monte (1945) can not afford the  monthly cost of $89.00 for the [employer] Xarel-me 30 day supply program. With this information I am asking that you continue to asist Ms Monte with this service. If you have any other questions or need further verification please dont hesitate to reach out to me.      Thank you for your cooperation with this           Jaymie Cadena MD

## 2025-02-26 NOTE — PROGRESS NOTES
[facility] - Clinic Note    Subjective      Ms. Monte is a 77 y.o. female who presents to clinic for Annual Exam    Patient is doing well overall.      PMH Kellie has a past medical history of Blood clot in vein, Deep vein thrombosis, DVT (deep venous thrombosis) (1972), Factor 5 Leiden mutation, heterozygous, Gastritis (03/20/2020), Hematochezia (01/2020), History of DVT (deep vein thrombosis) (8/20/2019), Hypercholesterolemia, Internal hemorrhoids, Malignant neoplasm of skin, OP (osteoporosis), Osteoarthritis, Pulmonary embolism, and Sigmoid polyp (03/20/2020).   PSXH Kellie has a past surgical history that includes cyst removed ENT (throat); Tubal ligation; Appendectomy; tonsils; Colonoscopy (8/07); Skin cancer excision; Inner ear surgery (Left); Cholecystectomy; Colonoscopy (N/A, 3/20/2020); and Esophagogastroduodenoscopy (N/A, 3/20/2020).    Kellie's family history includes Angina in her mother; Cancer (age of onset: 75) in her father; Cancer (age of onset: 80) in her mother; Clotting disorder in her brother, father, and mother; Colon cancer in her mother; Heart disease in her maternal grandmother and paternal grandmother.    Kellie reports that she quit smoking about 41 years ago. Her smoking use included cigarettes. She started smoking about 53 years ago. She has a 24.0 pack-year smoking history. She has never used smokeless tobacco. She reports current alcohol use of about 1.0 standard drink of alcohol per week. She reports that she does not use drugs.   [facility] Kellie has No Known Allergies.   JOSUÉ Hopkins has a current medication list which includes the following prescription(s): latanoprost, multivit-mineral-iron-lutein, pantoprazole, rivaroxaban, and rosuvastatin.     Review of Systems   Constitutional:  Negative for chills and fever.   HENT:  Negative for congestion and rhinorrhea.    Eyes:  Negative for visual disturbance.   Respiratory:  Negative for cough and shortness of breath.    Cardiovascular:  Negative  "for chest pain.   Gastrointestinal:  Negative for abdominal pain, constipation, diarrhea, nausea and vomiting.   Genitourinary:  Negative for dysuria.   Musculoskeletal:  Negative for myalgias.   Skin:  Negative for rash.   Neurological:  Negative for weakness and headaches.     Objective     /74 (BP Location: Left arm, Patient Position: Sitting, BP Method: Large (Manual))   Pulse 76   Temp 97.6 °F (36.4 °C) (Temporal)   Resp 16   Ht 5' 8" (1.727 m)   Wt 67 kg (147 lb 12.8 oz)   SpO2 97%   BMI 22.47 kg/m²     Physical Exam  Vitals and nursing note reviewed.   Constitutional:       General: She is not in acute distress.     Appearance: Normal appearance. She is well-developed. She is not diaphoretic.   HENT:      Head: Normocephalic and atraumatic.      Right Ear: External ear normal.      Left Ear: External ear normal.   Eyes:      General:         Right eye: No discharge.         Left eye: No discharge.   Cardiovascular:      Rate and Rhythm: Normal rate and regular rhythm.      Heart sounds: Normal heart sounds.   Pulmonary:      Effort: Pulmonary effort is normal.      Breath sounds: Normal breath sounds. No wheezing or rales.   Skin:     General: Skin is warm and dry.   Neurological:      Mental Status: She is alert and oriented to person, place, and time. Mental status is at baseline.   Psychiatric:         Mood and Affect: Mood normal.         Behavior: Behavior normal.         Thought Content: Thought content normal.         Judgment: Judgment normal.        Assessment/Plan     1. Hypercholesteremia  Lipid Panel      2. Hemoglobin A1c less than 7.0%  Hemoglobin A1C      3. Factor 5 Leiden mutation, heterozygous  Comprehensive Metabolic Panel    CBC Auto Differential    TSH      4. Asymptomatic menopausal state  DXA Bone Density Axial Skeleton 1 or more sites      5. Encounter for screening mammogram for malignant neoplasm of breast  Mammo Digital Screening Bilat w/ Yovani      6. Encounter for " long-term (current) use of medications  TSH         Due for annual labs as above. Mammogram scheduled.  DEXA scan scheduled.  Follow up in 6 months.    Future Appointments   Date Time Provider Department Center   10/17/2023  2:00 PM [facility] MAMMO1 [facility] MAMMO [facility]   10/17/2023  3:00 PM [facility] DEXA1 [facility] DEXA [facility]           Cali Palomo MD  Family Medicine  [facility]                 [Clear] : left tympanic membrane clear [Erythema] : erythema [Bulging] : bulging [Clear Rhinorrhea] : clear rhinorrhea [NL] : warm, clear [No Acute Distress] : no acute distress

## 2025-03-31 ENCOUNTER — OFFICE VISIT (OUTPATIENT)
Dept: FAMILY MEDICINE | Facility: CLINIC | Age: 80
End: 2025-03-31
Payer: MEDICARE

## 2025-03-31 VITALS
WEIGHT: 153.81 LBS | OXYGEN SATURATION: 98 % | BODY MASS INDEX: 23.31 KG/M2 | HEIGHT: 68 IN | DIASTOLIC BLOOD PRESSURE: 80 MMHG | SYSTOLIC BLOOD PRESSURE: 128 MMHG | HEART RATE: 72 BPM | RESPIRATION RATE: 15 BRPM

## 2025-03-31 DIAGNOSIS — Z00.00 ROUTINE MEDICAL EXAM: ICD-10-CM

## 2025-03-31 DIAGNOSIS — Z86.711 HISTORY OF PULMONARY EMBOLISM: ICD-10-CM

## 2025-03-31 DIAGNOSIS — D68.51 FACTOR V LEIDEN MUTATION: Primary | ICD-10-CM

## 2025-03-31 DIAGNOSIS — G62.89 OTHER POLYNEUROPATHY: ICD-10-CM

## 2025-03-31 DIAGNOSIS — Z12.31 ENCOUNTER FOR SCREENING MAMMOGRAM FOR MALIGNANT NEOPLASM OF BREAST: ICD-10-CM

## 2025-03-31 DIAGNOSIS — I82.812 SUPERFICIAL THROMBOSIS OF LEFT LOWER EXTREMITY: ICD-10-CM

## 2025-03-31 DIAGNOSIS — Z86.718 HISTORY OF DVT (DEEP VEIN THROMBOSIS): ICD-10-CM

## 2025-03-31 DIAGNOSIS — R14.2 BELCHING: ICD-10-CM

## 2025-03-31 DIAGNOSIS — Z13.1 SCREENING FOR DIABETES MELLITUS: ICD-10-CM

## 2025-03-31 DIAGNOSIS — E78.2 MIXED HYPERLIPIDEMIA: ICD-10-CM

## 2025-03-31 DIAGNOSIS — Z23 ENCOUNTER FOR ADMINISTRATION OF VACCINE: ICD-10-CM

## 2025-03-31 DIAGNOSIS — Z79.01 ANTICOAGULATED: ICD-10-CM

## 2025-03-31 DIAGNOSIS — Z13.29 THYROID DISORDER SCREEN: ICD-10-CM

## 2025-03-31 PROCEDURE — 99214 OFFICE O/P EST MOD 30 MIN: CPT | Mod: S$GLB,,, | Performed by: FAMILY MEDICINE

## 2025-03-31 PROCEDURE — 90653 IIV ADJUVANT VACCINE IM: CPT | Mod: S$GLB,,, | Performed by: FAMILY MEDICINE

## 2025-03-31 PROCEDURE — G0008 ADMIN INFLUENZA VIRUS VAC: HCPCS | Mod: S$GLB,,, | Performed by: FAMILY MEDICINE

## 2025-03-31 PROCEDURE — G2211 COMPLEX E/M VISIT ADD ON: HCPCS | Mod: S$GLB,,, | Performed by: FAMILY MEDICINE

## 2025-03-31 PROCEDURE — 99999 PR PBB SHADOW E&M-EST. PATIENT-LVL IV: CPT | Mod: PBBFAC,,, | Performed by: FAMILY MEDICINE

## 2025-03-31 RX ORDER — PANTOPRAZOLE SODIUM 40 MG/1
40 TABLET, DELAYED RELEASE ORAL DAILY
Qty: 90 TABLET | Refills: 3 | Status: SHIPPED | OUTPATIENT
Start: 2025-03-31

## 2025-03-31 RX ORDER — GABAPENTIN 100 MG/1
100 CAPSULE ORAL 3 TIMES DAILY
Qty: 90 CAPSULE | Refills: 5 | Status: SHIPPED | OUTPATIENT
Start: 2025-03-31

## 2025-03-31 NOTE — PROGRESS NOTES
Patient ID: Kellie Monte is a 79 y.o. female.    Chief Complaint: Follow-up (6 month follow up)    History of Present Illness    CHIEF COMPLAINT:  Kellie presents today for routine follow up    CURRENT SYMPTOMS:  She reports numbness and tingling in feet and legs since relocating. Symptoms are most bothersome at night, requiring toe and foot wiggling for relief. She experiences associated sensation of heat in affected areas. Symptoms improve with movement and walking but worsen with prolonged sitting or lying down.    VASCULAR HISTORY:  She has a history of pulmonary embolism at age 27 following childbirth and initiation of birth control pills, with multiple blood clots in her left leg at that time. She was subsequently diagnosed with Factor V Leiden. She experienced a second blood clot in her leg approximately 20 years later. Currently, she reports broken veins in her legs with blue and purple discoloration. She experiences significant leg swelling, particularly during summer months, managed with compression garments.    FAMILY HISTORY:  Father has Factor V Leiden and developed blood clot following knee surgery later in life. Mother had multiple blood clots throughout her lifetime. Children tested negative for Factor V Leiden.    CURRENT MEDICATIONS:  She takes Crestor 5 mg and Xarelto.      ROS:  ROS as indicated in HPI.         Physical Exam  Constitutional:       Appearance: Normal appearance.   HENT:      Head: Normocephalic and atraumatic.      Nose: Nose normal.   Eyes:      Extraocular Movements: Extraocular movements intact.      Pupils: Pupils are equal, round, and reactive to light.   Cardiovascular:      Rate and Rhythm: Normal rate and regular rhythm.      Pulses: Normal pulses.   Pulmonary:      Effort: Pulmonary effort is normal. No respiratory distress.      Breath sounds: Normal breath sounds. No wheezing or rhonchi.   Musculoskeletal:         General: Normal range of motion.   Skin:     General:  Skin is warm and dry.      Comments: Varicose veins noted in bilateral lower extremities   Neurological:      General: No focal deficit present.      Mental Status: She is alert and oriented to person, place, and time.   Psychiatric:         Mood and Affect: Mood normal.         Behavior: Behavior normal.         Thought Content: Thought content normal.          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 Suspect peripheral neuropathy, potentially related to circulatory issues.  - Considered and discussed Gabapentin (Neurontin) as initial treatment for peripheral neuropathy symptoms.  - Reviewed history of pulmonary embolism and Factor V Leiden, noting current anticoagulation with Xarelto.  - Evaluated legs for chronic vascular changes, noting some prominent veins but no significant edema.    FACTOR V LEIDEN (ACTIVATED PROTEIN C RESISTANCE):  - Assessed the patient's condition as Factor V Leiden, a genetic clotting disorder.  - Noted family history of Factor V Leiden and clotting issues.  - Noted patient's history of pulmonary embolism and multiple blood clots in the left leg at age 27, and another leg clot approximately 20 years later.  - Continued long-term anticoagulant therapy with Xarelto, which replaced Coumadin around 2011.  - Continued Xarelto for long-term anticoagulant therapy.  - Monitored effectiveness and potential side effects of the medication.    PERIPHERAL NEUROPATHY:  - Evaluated the patient's reported numbness in toes, feet, and legs, especially at night, with symptoms improving with movement.  - Assessed the condition as peripheral neuropathy, possibly related to  circulatory issues.  - Explained peripheral neuropathy, its potential causes including circulatory issues, diabetes, smoking, and nerve compression.  - Prescribed Gabapentin 100 mg, up to 3 times daily as needed, with option to increase to 200 mg or 300 mg at night if 100 mg is insufficient.  - Instructed the patient to report effectiveness of the medication and any side effects.  - Explained potential causes of peripheral neuropathy including circulatory issues.  - Advised the patient to continue using warm water jets on legs and toes for symptom relief.    CHRONIC VENOUS INSUFFICIENCY:  - Monitored patient's reported swelling in legs, especially in summertime.  - Observed prominent veins in feet, some broken veins around the knee, and discoloration.  - No significant edema noted.  - Assessed the condition as chronic vascular issues related to aging and weakening of vein valves.  - Recommend continued use of compression garments to manage swelling.    LIPID MANAGEMENT:  - Continued Crestor 5 mg daily.  - Ordered fasting labs including lipid panel.         Plan:          Factor V Leiden mutation  -     rivaroxaban (XARELTO) 20 mg Tab; Take 1 tablet (20 mg total) by mouth every evening.  Dispense: 90 tablet; Refill: 3  -     CBC Auto Differential; Future; Expected date: 03/31/2025    Anticoagulated  -     rivaroxaban (XARELTO) 20 mg Tab; Take 1 tablet (20 mg total) by mouth every evening.  Dispense: 90 tablet; Refill: 3  -     CBC Auto Differential; Future; Expected date: 03/31/2025    Superficial thrombosis of left lower extremity  Comments:  chronic  Orders:  -     rivaroxaban (XARELTO) 20 mg Tab; Take 1 tablet (20 mg total) by mouth every evening.  Dispense: 90 tablet; Refill: 3  -     CBC Auto Differential; Future; Expected date: 03/31/2025    History of DVT (deep vein thrombosis)  -     rivaroxaban (XARELTO) 20 mg Tab; Take 1 tablet (20 mg total) by mouth every evening.  Dispense: 90 tablet; Refill: 3    History  of pulmonary embolism  -     rivaroxaban (XARELTO) 20 mg Tab; Take 1 tablet (20 mg total) by mouth every evening.  Dispense: 90 tablet; Refill: 3    Belching  -     pantoprazole (PROTONIX) 40 MG tablet; Take 1 tablet (40 mg total) by mouth once daily.  Dispense: 90 tablet; Refill: 3    Other polyneuropathy  -     gabapentin (NEURONTIN) 100 MG capsule; Take 1 capsule (100 mg total) by mouth 3 (three) times daily.  Dispense: 90 capsule; Refill: 5  -     TSH; Future; Expected date: 03/31/2025    Encounter for administration of vaccine  -     influenza (adjuvanted) (Fluad) 45 mcg/0.5 mL IM vaccine (> or = 64 yo) 0.5 mL  -     CBC Auto Differential; Future; Expected date: 03/31/2025  -     Comprehensive Metabolic Panel; Future; Expected date: 03/31/2025    Routine medical exam  -     Microalbumin/Creatinine Ratio, Urine; Future; Expected date: 03/31/2025  -     Vitamin D; Future; Expected date: 03/31/2025  -     Vitamin B12; Future; Expected date: 03/31/2025  -     Lipid Panel; Future; Expected date: 03/31/2025  -     Comprehensive Metabolic Panel; Future; Expected date: 03/31/2025  -     Hemoglobin A1C; Future; Expected date: 03/31/2025  -     TSH; Future; Expected date: 03/31/2025    Mixed hyperlipidemia  -     Lipid Panel; Future; Expected date: 03/31/2025    Thyroid disorder screen  -     TSH; Future; Expected date: 03/31/2025    Screening for diabetes mellitus  -     Hemoglobin A1C; Future; Expected date: 03/31/2025    Encounter for screening mammogram for malignant neoplasm of breast  -     Mammo Digital Screening Bilat w/ Yovani (XPD); Future; Expected date: 10/20/2025    In excessive of 40 minutes total time spent for evaluation and management services. Time included elements of the following: time spent preparing to see patient, obtaining and reviewing separately obtained history, exam, evaluation, counseling and education of patient/family member or care giver, documenting in the HMR, independently interpreting  results and communication of results, coordination of care ordering medications, tests, or procedures, referral and communication to other health care professionals.      Follow up in about 6 months (around 9/30/2025), or if symptoms worsen or fail to improve.    This note was generated with the assistance of ambient listening technology. Verbal consent was obtained by the patient and accompanying visitor(s) for the recording of patient appointment to facilitate this note. I attest to having reviewed and edited the generated note for accuracy, though some syntax or spelling errors may persist. Please contact the author of this note for any clarification.

## 2025-06-29 DIAGNOSIS — E78.00 HYPERCHOLESTEREMIA: ICD-10-CM

## 2025-06-29 DIAGNOSIS — G62.89 OTHER POLYNEUROPATHY: ICD-10-CM

## 2025-06-29 NOTE — TELEPHONE ENCOUNTER
Unable to retrieve patient chart and identify care due.  [facility] Embedded Care Due Messages. Reference number: [id number].   6/29/2025 1:10:35 PM CDT

## 2025-06-30 RX ORDER — ROSUVASTATIN CALCIUM 5 MG/1
5 TABLET, COATED ORAL NIGHTLY
Qty: 90 TABLET | Refills: 3 | Status: SHIPPED | OUTPATIENT
Start: 2025-06-30

## 2025-06-30 RX ORDER — GABAPENTIN 100 MG/1
100 CAPSULE ORAL 3 TIMES DAILY
Qty: 90 CAPSULE | Refills: 5 | Status: SHIPPED | OUTPATIENT
Start: 2025-06-30

## 2025-07-31 ENCOUNTER — TELEPHONE (OUTPATIENT)
Dept: FAMILY MEDICINE | Facility: CLINIC | Age: 80
End: 2025-07-31
Payer: MEDICARE

## 2025-07-31 NOTE — TELEPHONE ENCOUNTER
----- Message from Nurse Mcclelland sent at 7/31/2025  9:34 AM CDT -----  Regarding: FW: Paper Work  Unsure what paperwork was left.   Can you confirm?  I am sure appointment is required to complete  ----- Message -----  From: Danielle Kaye  Sent: 7/31/2025   9:32 AM CDT  To: Surinder RUSSO Staff  Subject: Paper Work                                       Patient dropped off paper work needed to be filled out and faxed back. Patient also requested an EKG be set up in regards to the paper work needed. At registration desk.         Thanks

## 2025-08-11 DIAGNOSIS — Z01.810 PREOP CARDIOVASCULAR EXAM: Primary | ICD-10-CM

## 2025-08-15 ENCOUNTER — TELEPHONE (OUTPATIENT)
Dept: FAMILY MEDICINE | Facility: CLINIC | Age: 80
End: 2025-08-15
Payer: MEDICARE

## 2025-08-25 ENCOUNTER — HOSPITAL ENCOUNTER (OUTPATIENT)
Dept: RADIOLOGY | Facility: HOSPITAL | Age: 80
Discharge: HOME | End: 2025-08-25
Attending: FAMILY MEDICINE
Payer: MEDICARE

## 2025-08-25 ENCOUNTER — RESULTS FOLLOW-UP (OUTPATIENT)
Dept: FAMILY MEDICINE | Facility: CLINIC | Age: 80
End: 2025-08-25
Payer: MEDICARE

## 2025-08-25 ENCOUNTER — TELEPHONE (OUTPATIENT)
Dept: FAMILY MEDICINE | Facility: CLINIC | Age: 80
End: 2025-08-25
Payer: MEDICARE

## 2025-08-25 ENCOUNTER — HOSPITAL ENCOUNTER (OUTPATIENT)
Dept: CARDIOLOGY | Facility: HOSPITAL | Age: 80
Discharge: HOME | End: 2025-08-25
Attending: FAMILY MEDICINE
Payer: MEDICARE

## 2025-08-25 DIAGNOSIS — Z01.810 PREOP CARDIOVASCULAR EXAM: ICD-10-CM

## 2025-08-25 LAB
OHS QRS DURATION: 80 MS
OHS QTC CALCULATION: 456 MS

## 2025-08-25 PROCEDURE — 71046 X-RAY EXAM CHEST 2 VIEWS: CPT | Mod: TC

## 2025-08-25 PROCEDURE — 93010 ELECTROCARDIOGRAM REPORT: CPT | Mod: ,,, | Performed by: INTERNAL MEDICINE

## 2025-08-25 PROCEDURE — 93005 ELECTROCARDIOGRAM TRACING: CPT

## 2025-08-25 PROCEDURE — 71046 X-RAY EXAM CHEST 2 VIEWS: CPT | Mod: 26,,, | Performed by: RADIOLOGY

## 2025-08-26 ENCOUNTER — OFFICE VISIT (OUTPATIENT)
Dept: FAMILY MEDICINE | Facility: CLINIC | Age: 80
End: 2025-08-26
Payer: MEDICARE

## 2025-08-26 VITALS
WEIGHT: 150.5 LBS | SYSTOLIC BLOOD PRESSURE: 118 MMHG | DIASTOLIC BLOOD PRESSURE: 70 MMHG | BODY MASS INDEX: 22.81 KG/M2 | OXYGEN SATURATION: 98 % | HEART RATE: 74 BPM | RESPIRATION RATE: 14 BRPM | HEIGHT: 68 IN

## 2025-08-26 DIAGNOSIS — Z79.899 OTHER LONG TERM (CURRENT) DRUG THERAPY: ICD-10-CM

## 2025-08-26 DIAGNOSIS — Z01.818 PREOPERATIVE EXAMINATION: Primary | ICD-10-CM

## 2025-08-26 DIAGNOSIS — D68.51 FACTOR V LEIDEN MUTATION: ICD-10-CM

## 2025-08-26 PROCEDURE — 99999 PR PBB SHADOW E&M-EST. PATIENT-LVL III: CPT | Mod: PBBFAC,,, | Performed by: NURSE PRACTITIONER

## 2025-08-26 PROCEDURE — 99214 OFFICE O/P EST MOD 30 MIN: CPT | Mod: S$GLB,,, | Performed by: NURSE PRACTITIONER

## (undated) DEVICE — TUBING SUCTION 3/16X10 2 CONN

## (undated) DEVICE — SOL WATER STRL IRR 1000ML

## (undated) DEVICE — Device

## (undated) DEVICE — SYR SLIP TIP 5CC

## (undated) DEVICE — MARKER ENDOSCOPIC SPOT EX

## (undated) DEVICE — VALVE ORCAPOD AIR WTR JET CONN

## (undated) DEVICE — SEE MEDLINE ITEM 146416

## (undated) DEVICE — FORCEP BIOSY RJ 4 HOT 2.2X240

## (undated) DEVICE — GLOVE SURGEONS ULTRA TOUCH 6.5

## (undated) DEVICE — INJECTOR VARICES 23GAX240CM

## (undated) DEVICE — SNARE CAPTIVATOR II 25MM ROUND

## (undated) DEVICE — CANISTER SUCTION 3000CC